# Patient Record
Sex: MALE | Race: WHITE
[De-identification: names, ages, dates, MRNs, and addresses within clinical notes are randomized per-mention and may not be internally consistent; named-entity substitution may affect disease eponyms.]

---

## 2018-07-07 ENCOUNTER — HOSPITAL ENCOUNTER (EMERGENCY)
Dept: HOSPITAL 97 - ER | Age: 53
Discharge: HOME | End: 2018-07-07
Payer: COMMERCIAL

## 2018-07-07 VITALS — OXYGEN SATURATION: 100 % | DIASTOLIC BLOOD PRESSURE: 89 MMHG | SYSTOLIC BLOOD PRESSURE: 155 MMHG

## 2018-07-07 VITALS — TEMPERATURE: 98.2 F

## 2018-07-07 DIAGNOSIS — J44.9: ICD-10-CM
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
## 2018-07-07 DIAGNOSIS — F17.210: ICD-10-CM

## 2018-07-07 DIAGNOSIS — S62.630A: Primary | ICD-10-CM

## 2018-07-07 DIAGNOSIS — Z88.6: ICD-10-CM
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
## 2018-07-07 PROCEDURE — 0JQJ0ZZ REPAIR RIGHT HAND SUBCUTANEOUS TISSUE AND FASCIA, OPEN APPROACH: ICD-10-PCS

## 2018-07-07 PROCEDURE — 99283 EMERGENCY DEPT VISIT LOW MDM: CPT

## 2018-07-07 NOTE — EDPHYS
Physician Documentation                                                                           

 [facility]                                                                

Name: Jose D Demarco                                                                             

Age: 52 yrs                                                                                       

Sex: Male                                                                                         

: 1965                                                                                   

MRN: [id number]                                                                                   

Arrival Date: 2018                                                                          

Time: 10:22                                                                                       

Account#: [id number]                                                                            

Bed 15                                                                                            

Private MD: Bryan Murdock T                                                                        

ED Physician Olaf Lara                                                                       

HPI:                                                                                              

                                                                                             

10:36 This 52 yrs old  Male presents to ER via Ambulatory with complaints of Finger  cp  

      laceration.                                                                                 

10:36 The patient or guardian reports injury, a laceration, irregular, dirty. The complaints  cp  

      affect the right index finger. Context: resulted from finger being caught in chain          

      link. Onset: The symptoms/episode began/occurred just prior to arrival.                     

10:36 Associated signs and symptoms: Pertinent negatives: cyanosis distally, numbness         cp  

      distally.                                                                                   

                                                                                                  

Historical:                                                                                       

- Allergies:                                                                                      

10:34 Aspirin;                                                                                hj  

- Home Meds:                                                                                      

10:34 Albuterol Inhl [Active]; duloxetine Oral [Active]; Metformin Oral [Active]; naproxen    hj  

      Oral [Active]; montelukast Oral [Active];                                                   

10:34 levothyroxine 75 mcg tab 1 tab once daily [Active];                                     hj  

- PMHx:                                                                                           

10:34 Anemia; Asthma; Bronchitis; COPD;                                                       hj  

- PSHx:                                                                                           

10:34 Appendectomy;                                                                           hj  

                                                                                                  

- Immunization history:: Adult Immunizations up to date.                                          

- Social history:: Smoking status: Patient uses tobacco products, smokes one pack                 

  cigarettes per day.                                                                             

- Ebola Screening: : Patient negative for fever greater than or equal to 101.5 degrees            

  Fahrenheit, and additional compatible Ebola Virus Disease symptoms Patient denies               

  exposure to infectious person Patient denies travel to an Ebola-affected area in the            

  21 days before illness onset No symptoms or risks identified at this time.                      

                                                                                                  

                                                                                                  

ROS:                                                                                              

10:38 Eyes: Negative for injury, pain, redness, and discharge.                                cp  

10:38 Constitutional: Negative for body aches, chills, fever, poor PO intake.                     

10:38 ENT: Negative for drainage from ear(s), ear pain, sore throat, difficulty swallowing,       

      difficulty handling secretions.                                                             

10:38 Cardiovascular: Negative for chest pain.                                                    

10:38 Respiratory: Negative for cough, shortness of breath, wheezing.                             

10:38 Abdomen/GI: Negative for abdominal pain, vomiting, diarrhea, constipation.                  

10:38 MS/extremity: Positive for injury or acute deformity, laceration, pain, of the right        

      index finger.                                                                               

10:38 All other systems are negative.                                                             

                                                                                                  

Exam:                                                                                             

10:45 Constitutional: The patient appears in no acute distress, alert, awake, non-toxic, well cp  

      developed, well nourished.                                                                  

10:45 Head/Face:  Normocephalic, atraumatic.                                                  cp  

10:45 Eyes: Periorbital structures: appear normal, Conjunctiva: normal, no exudate, no            

      injection, Lids and lashes: appear normal, bilaterally.                                     

10:45 ENT: External ear(s): are unremarkable, Nose: is normal, Mouth: is normal, Posterior        

      pharynx: is normal, airway is patent.                                                       

10:45 Chest/axilla: Inspection: normal.                                                           

10:45 Cardiovascular: Rate: tachycardic.                                                          

10:45 Respiratory: the patient does not display signs of respiratory distress,  Respirations:     

      normal, no use of accessory muscles, no retractions, no splinting, no tachypnea.            

10:45 Abdomen/GI: Exam negative for discomfort, distension, guarding, Inspection: abdomen         

      appears normal.                                                                             

10:45 Musculoskeletal/extremity: ROM: full active range of motion, in the right index finger,     

      Perfusion: the extremity is normally perfused throughout, Tendon exam: specific tendon      

      testing normal through active and passive range of motion                                   

10:45 Skin: injury, laceration(s), of the lateral aspect distal phalanx right index finger,       

      that can be described as linear, with mild bleeding.                                        

10:45 Neuro: Sensation: 2 point discrimination is normal.                                         

                                                                                                  

Vital Signs:                                                                                      

10:36  / 87; Pulse 105; Resp 18; Temp 98.2(O); Pulse Ox 97% on R/A; Weight 117.93 kg;   hj  

      Height 5 ft. 11 in. (180.34 cm); Pain 10/10;                                                

12:38  / 89; Pulse 92; Resp 18; Pulse Ox 100% on R/A;                                   hj  

10:36 Body Mass Index 36.26 (117.93 kg, 180.34 cm)                                            hj  

                                                                                                  

Laceration:                                                                                       

12:15 Wound Repair of 2.5cm ( 1.0in ) subcutaneous laceration to lateral aspect distal        cp  

      phalanx right index finger. Linear shaped.. Distal neuro/vascular/tendon intact.            

      Anesthesia: Digital block administered with 5 mls of Lido/Marcaine. Wound prep:             

      Moderate cleansing by nurse, Wound irrigation by nurse. Skin closed with 4 5-0 Prolene      

      using simple sutures and sterile technique. Dressed with Bacitracin, tube gauze.            

      Patient tolerated well.                                                                     

                                                                                                  

MDM:                                                                                              

10:25 Patient medically screened.                                                             cp  

10:45 Differential diagnosis: dislocation, open fracture, closed fracture, nail injury.       cp  

12:19 Data reviewed: vital signs, nurses notes, radiologic studies, plain films.              cp  

12:19 Test interpretation: by ED physician or midlevel provider: plain radiologic studies.    cp  

      Counseling: I had a detailed discussion with the patient and/or guardian regarding: the     

      historical points, exam findings, and any diagnostic results supporting the                 

      discharge/admit diagnosis, radiology results, the need for outpatient follow up, a          

      family practitioner, to return to the emergency department if symptoms worsen or            

      persist or if there are any questions or concerns that arise at home. Response to           

      treatment: the patient's symptoms have markedly improved after treatment, and as a          

      result, I will discharge patient.                                                           

                                                                                                  

                                                                                             

10:32 Order name: XRAY Hand RIGHT 3 View; Complete Time: 11:26                                cp  

                                                                                             

10:32 Order name: Dressing - Wound; Complete Time: 10:53                                      cp  

                                                                                             

10:32 Order name: Gloves, Sterile; Complete Time: 10:53                                       cp  

                                                                                             

10:32 Order name: Setup Suture Tray; Complete Time: 10:53                                     cp  

                                                                                             

10:32 Order name: Wound Care: please clean and irrigate wound; Complete Time: 11:01           cp  

                                                                                                  

Administered Medications:                                                                         

10:46 Drug: Ibuprofen 800 mg Route: PO;                                                         

11:02 Follow up: Response: No adverse reaction                                                hj  

11:31 Drug: Lidocaine (1 %) 5 ml Volume: 5 ml; Route: Infiltration;                           hj  

11:31 Drug: Marcaine (0.5 %) 5 ml Volume: 10 ml; Route: Infiltration;                           

                                                                                                  

                                                                                                  

Disposition:                                                                                      

18 12:20 Discharged to Home. Impression: Laceration without foreign body of right index     

  finger without damage to nail, Avulsion Fracture Distal Phalanx Right Index                     

  Finger.                                                                                         

- Condition is Stable.                                                                            

- Discharge Instructions: Laceration Care, Adult.                                                 

- Prescriptions for Keflex 500 mg Oral Capsule - take 1 capsule by ORAL route every 6             

  hours for 10 days; 40 capsule. Tramadol 50 mg Oral Tablet - take 1 tablet by ORAL               

  route every 8 hours as needed; 12 tablet.                                                       

- Medication Reconciliation Form, Thank You Letter, Antibiotic Education, Prescription            

  Opioid Use form.                                                                                

- Follow up: Guzman Zaragoza MD; When: 5 - 6 days; Reason: Recheck today's complaints.           

- Problem is new.                                                                                 

- Symptoms have improved.                                                                         

                                                                                                  

                                                                                                  

                                                                                                  

Addendum:                                                                                         

07/15/2018                                                                                        

     20:40 Co-signature as Attending Physician, Olaf Lara MD I agree with the assessment and   k
dr

           plan of care.                                                                          

                                                                                                  

Signatures:                                                                                       

Dispatcher [employer]                           EDMS                                                 

Olaf Lara MD MD   [facility]                                                  

Dane Painter RN                      RN   hj                                                   

Yunier Powell PA                         PA   cp                                                   

                                                                                                  

Corrections: (The following items were deleted from the chart)                                    

                                                                                             

12:23 12:20 2018 12:20 Discharged to Home. Impression: Laceration without foreign body  cp  

      of right index finger without damage to nail. Condition is Stable. Forms are Medication     

      Reconciliation Form, Thank You Letter, Antibiotic Education, Prescription Opioid Use.       

      Follow up: Guzman Zaragoza; When: 5 - 6 days; Reason: Recheck today's complaints.            

      Problem is new. Symptoms have improved. cp                                                  

12:38 12:23 2018 12:20 Discharged to Home. Impression: Laceration without foreign body  hj  

      of right index finger without damage to nail; Avulsion Fracture Distal Phalanx Right        

      Index Finger. Condition is Stable. Discharge Instructions: Laceration Care, Adult.          

      Prescriptions for Keflex 500 mg Oral Capsule - take 1 capsule by ORAL route every 6         

      hours for 10 days; 40 capsule, Tramadol 50 mg Oral Tablet - take 1 tablet by ORAL route     

      every 8 hours as needed; 12 tablet. and Forms are Medication Reconciliation Form, Thank     

      You Letter, Antibiotic Education, Prescription Opioid Use. Follow up: Guzman Zaragoza;       

      When: 5 - 6 days; Reason: Recheck today's complaints. Problem is new. Symptoms have         

      improved. cp                                                                                

                                                                                                  

**************************************************************************************************

## 2018-07-07 NOTE — RAD REPORT
EXAM DESCRIPTION:  RAD - Hand Right 3 View - 7/7/2018 10:46 am

 

CLINICAL HISTORY:  Right hand pain status post injury

 

FINDINGS:  Bony density lies along the dorsal aspect of the second DIP joint. Most likely this repres
ents an ossicle. An avulsion fracture is doubtful but should be correlated clinically.

 

Swelling involves the soft tissues of the second distal phalanx.

 

No dislocation is noted.

## 2018-07-07 NOTE — ER
Nurse's Notes                                                                                     

 [facility]                                                                

Name: Jose D Demarco                                                                             

Age: 52 yrs                                                                                       

Sex: Male                                                                                         

: 1965                                                                                   

MRN: [id number]                                                                                   

Arrival Date: 2018                                                                          

Time: 10:22                                                                                       

Account#: [id number]                                                                            

Bed 15                                                                                            

Private MD: Bryan Murdock T                                                                        

Diagnosis: Laceration without foreign body of right index finger without damage to nail;Avulsion  

  Fracture Distal Phalanx Right Index Finger                                                      

                                                                                                  

Presentation:                                                                                     

                                                                                             

10:29 Presenting complaint: Patient states: at work today around 1030 am when my R index      hj  

      finger was caught on the sprocket on the gate, got laceration; reports throbbing pain;      

      denies tingling;. Transition of care: patient was not received from another setting of      

      care. Onset of symptoms was 2018. Risk Assessment: Do you want to hurt             

      yourself or someone else? Patient reports no desire to harm self or others. Initial         

      Sepsis Screen: Does the patient meet any 2 criteria? No. Patient's initial sepsis           

      screen is negative. Does the patient have a suspected source of infection? No.              

      Patient's initial sepsis screen is negative. Care prior to arrival: None.                   

10:29 Method Of Arrival: Ambulatory                                                             

10:29 Acuity: GEORGETTE 4                                                                           hj  

                                                                                                  

Triage Assessment:                                                                                

10:35 General: Appears in no apparent distress. uncomfortable, Behavior is calm, cooperative, hj  

      appropriate for age. Pain: Complains of pain in dorsal aspect of distal phalanx of          

      right index finger and right index fingernail.                                              

                                                                                                  

Historical:                                                                                       

- Allergies:                                                                                      

10:34 Aspirin;                                                                                hj  

- Home Meds:                                                                                      

10:34 Albuterol Inhl [Active]; duloxetine Oral [Active]; Metformin Oral [Active]; naproxen    hj  

      Oral [Active]; montelukast Oral [Active];                                                   

10:34 levothyroxine 75 mcg tab 1 tab once daily [Active];                                     hj  

- PMHx:                                                                                           

10:34 Anemia; Asthma; Bronchitis; COPD;                                                       hj  

- PSHx:                                                                                           

10:34 Appendectomy;                                                                           hj  

                                                                                                  

- Immunization history:: Adult Immunizations up to date.                                          

- Social history:: Smoking status: Patient uses tobacco products, smokes one pack                 

  cigarettes per day.                                                                             

- Ebola Screening: : Patient negative for fever greater than or equal to 101.5 degrees            

  Fahrenheit, and additional compatible Ebola Virus Disease symptoms Patient denies               

  exposure to infectious person Patient denies travel to an Ebola-affected area in the            

  21 days before illness onset No symptoms or risks identified at this time.                      

                                                                                                  

                                                                                                  

Screening:                                                                                        

10:35 Abuse screen: Denies threats or abuse. Denies injuries from another. Nutritional        hj  

      screening: No deficits noted. Tuberculosis screening: No symptoms or risk factors           

      identified. Fall Risk None identified.                                                      

                                                                                                  

Vital Signs:                                                                                      

10:36  / 87; Pulse 105; Resp 18; Temp 98.2(O); Pulse Ox 97% on R/A; Weight 117.93 kg;   hj  

      Height 5 ft. 11 in. (180.34 cm); Pain 10/10;                                                

12:38  / 89; Pulse 92; Resp 18; Pulse Ox 100% on R/A;                                   hj  

10:36 Body Mass Index 36.26 (117.93 kg, 180.34 cm)                                            hj  

                                                                                                  

ED Course:                                                                                        

10:22 Patient arrived in ED.                                                                  mr  

10:22 Bryan Murdock MD is Private Physician.                                                   mr  

10:24 Yunier Powell PA is PHCP.                                                                cp  

10:24 Olfa Lara MD is Attending Physician.                                              cp  

10:29 Dane Painter, RN is Primary Nurse.                                                    hj  

10:31 Triage completed.                                                                       hj  

10:35 Arm band placed on right wrist.                                                         hj  

10:35 Patient has correct armband on for positive identification. Bed in low position. Call   hj  

      light in reach. Side rails up X 1.                                                          

10:46 XRAY Hand RIGHT 3 View In Process Unspecified.                                          EDMS

12:19 Guzman Zaragoza MD is Referral Physician.                                              cp  

12:37 No provider procedures requiring assistance completed. Patient did not have IV access   hj  

      during this emergency room visit.                                                           

                                                                                                  

Administered Medications:                                                                         

10:46 Drug: Ibuprofen 800 mg Route: PO;                                                       hj  

11:02 Follow up: Response: No adverse reaction                                                hj  

11:31 Drug: Lidocaine (1 %) 5 ml Volume: 5 ml; Route: Infiltration;                           hj  

11:31 Drug: Marcaine (0.5 %) 5 ml Volume: 10 ml; Route: Infiltration;                         hj  

                                                                                                  

                                                                                                  

Outcome:                                                                                          

12:20 Discharge ordered by MD.                                                                cp  

12:37 Discharged to home ambulatory, with family.                                             hj  

12:37 Condition: stable                                                                           

12:37 Discharge instructions given to patient, family, Instructed on discharge instructions,      

      follow up and referral plans. medication usage, wound care, Demonstrated understanding      

      of instructions, follow-up care, medications, wound care, Prescriptions given X 2.          

12:38 Patient left the ED.                                                                    hj  

                                                                                                  

Signatures:                                                                                       

Dispatcher [employer]                           Candice Brown                                mr                                                   

Dane Painter, RN                      RN   Yunier Arthur PA                         PA   cp                                                   

                                                                                                  

**************************************************************************************************

## 2019-02-06 ENCOUNTER — HOSPITAL ENCOUNTER (EMERGENCY)
Dept: HOSPITAL 97 - ER | Age: 54
Discharge: HOME | End: 2019-02-06
Payer: COMMERCIAL

## 2019-02-06 DIAGNOSIS — I10: ICD-10-CM

## 2019-02-06 DIAGNOSIS — Z88.6: ICD-10-CM

## 2019-02-06 DIAGNOSIS — E11.9: ICD-10-CM

## 2019-02-06 DIAGNOSIS — R33.9: ICD-10-CM

## 2019-02-06 DIAGNOSIS — J44.9: ICD-10-CM

## 2019-02-06 DIAGNOSIS — N32.3: Primary | ICD-10-CM

## 2019-02-06 PROCEDURE — 76377 3D RENDER W/INTRP POSTPROCES: CPT

## 2019-02-06 PROCEDURE — 96372 THER/PROPH/DIAG INJ SC/IM: CPT

## 2019-02-06 PROCEDURE — 74176 CT ABD & PELVIS W/O CONTRAST: CPT

## 2019-02-06 PROCEDURE — 99285 EMERGENCY DEPT VISIT HI MDM: CPT

## 2019-02-06 PROCEDURE — 51702 INSERT TEMP BLADDER CATH: CPT

## 2019-02-06 NOTE — ER
Nurse's Notes                                                                                     

 [facility]                                                                

Name: Jose D Demarco                                                                             

Age: 53 yrs                                                                                       

Sex: Male                                                                                         

: 1965                                                                                   

MRN: [id number]                                                                                   

Arrival Date: 2019                                                                          

Time: 19:29                                                                                       

Account#: [id number]                                                                            

Bed 13                                                                                            

Private MD: Bryan Murdock T                                                                        

Diagnosis: Diverticulum of bladder;Low back pain;Retention of urine                               

                                                                                                  

Presentation:                                                                                     

                                                                                             

19:45 Presenting complaint: Patient states: Left lower back pain for 1 week that got worse    aj  

      last night. Reports similar incident in August. Transition of care: patient was not         

      received from another setting of care. Onset of symptoms was 2019. Risk         

      Assessment: Do you want to hurt yourself or someone else? Patient reports no desire to      

      harm self or others. Initial Sepsis Screen: Does the patient meet any 2 criteria? No.       

      Patient's initial sepsis screen is negative. Does the patient have a suspected source       

      of infection? No. Patient's initial sepsis screen is negative. Care prior to arrival:       

      None.                                                                                       

19:45 Method Of Arrival: Ambulatory                                                           aj  

19:45 Acuity: GEORGETTE 4                                                                           aj  

                                                                                                  

Triage Assessment:                                                                                

19:45 General: Appears in no apparent distress. uncomfortable, Behavior is calm, cooperative, aj  

      appropriate for age. Pain: Complains of pain in left low back, left lower back, left        

      gluteus chad and left gluteal fold. Neuro: Level of Consciousness is awake, alert,       

      obeys commands, Oriented to person, place, time, situation, Appropriate for age.            

      Respiratory: Airway is patent Respiratory effort is even, unlabored, Respiratory            

      pattern is regular, symmetrical. Derm: Skin is intact, is healthy with good turgor,         

      Skin is pink, warm \T\ dry. normal. Musculoskeletal: Circulation, motion, and sensation     

      intact. Range of motion: intact in all extremities.                                         

                                                                                                  

Historical:                                                                                       

- Allergies:                                                                                      

19:45 Aspirin;                                                                                aj  

- Home Meds:                                                                                      

19:45 Albuterol Inhl [Active]; duloxetine Oral [Active]; levothyroxine 75 mcg tab 1 tab once  aj  

      daily [Active]; Metformin Oral [Active]; Naproxen Oral [Active]; cansartan [Active];        

      tujeo [Active];                                                                             

- PMHx:                                                                                           

19:45 Asthma; Bronchitis; COPD; Diabetes - IDDM; Hypertension;                                aj  

- PSHx:                                                                                           

19:45 Appendectomy;                                                                           aj  

                                                                                                  

- Immunization history:: Adult Immunizations up to date.                                          

- Social history:: Smoking status: Patient uses tobacco products, smokes one pack                 

  cigarettes per day.                                                                             

- Ebola Screening: : Patient negative for fever greater than or equal to 101.5 degrees            

  Fahrenheit, and additional compatible Ebola Virus Disease symptoms Patient denies               

  exposure to infectious person Patient denies travel to an Ebola-affected area in the            

  21 days before illness onset No symptoms or risks identified at this time.                      

                                                                                                  

                                                                                                  

Screenin:49 Abuse screen: Denies threats or abuse. Denies injuries from another. Nutritional        rr5 

      screening: No deficits noted. Tuberculosis screening: No symptoms or risk factors           

      identified. Fall Risk None identified. Total Rubio Fall Scale indicates No Risk (0-24       

      pts).                                                                                       

                                                                                                  

Assessment:                                                                                       

20:25 General: Appears in no apparent distress. uncomfortable, Behavior is calm, cooperative, rr5 

      appropriate for age. Pain: Complains of pain in low back area, left low back and            

      buttocks Pain radiates to left leg Pain currently is 8 out of 10 on a pain scale.           

      Quality of pain is described as aching, Pain began gradually, Is intermittent,              

      Aggravated by weight bearing. Neuro: Level of Consciousness is awake, alert, obeys          

      commands, Oriented to person, place, time, situation, Appropriate for age.                  

      Cardiovascular: Capillary refill < 3 seconds Patient's skin is warm and dry.                

      Respiratory: Airway is patent Respiratory effort is even, unlabored, Respiratory            

      pattern is regular, symmetrical.                                                            

20:25 GI: No signs and/or symptoms were reported involving the gastrointestinal system. :   rr5 

      No signs and/or symptoms were reported regarding the genitourinary system. EENT: No         

      signs and/or symptoms were reported regarding the EENT system. Derm: Skin is intact,        

      Skin is pink, warm \T\ dry. Skin temperature is warm. Musculoskeletal: Capillary refill <   

      3 seconds, Range of motion: limited in left hip and back.                                   

21:30 Reassessment: Patient appears in no apparent distress at this time. No changes from     rr5 

      previously documented assessment. Patient is alert, oriented x 3, equal unlabored           

      respirations, skin warm/dry/pink. awaiting for CT scan result.                              

22:40 Reassessment: Patient appears in no apparent distress at this time. Patient is alert,   rr5 

      oriented x 3, equal unlabored respirations, skin warm/dry/pink. Calvillo catheter inserted     

      aseptically F16 connected to urine bag. positive returned of urine.                         

23:23 Reassessment: Patient appears in no apparent distress at this time. Patient is alert,   rr5 

      oriented x 3, equal unlabored respirations, skin warm/dry/pink. I feel better now.          

      discharge instruction given and explained with out complaints made. follow up care for      

      the Calvillo catheter taught. Patient denies pain at this time. Patient states feeling         

      better. Patient states symptoms have improved.                                              

                                                                                                  

Vital Signs:                                                                                      

19:45  / 92; Pulse 96; Resp 20; Temp 98.0; Pulse Ox 98% on R/A; Weight 96.62 kg; Height aj  

      5 ft. 11 in. (180.34 cm);                                                                   

21:00  / 70; Pulse 70; Resp 16; Pulse Ox 99% ;                                          rr5 

22:00  / 81; Pulse 90; Resp 17; Pulse Ox 100% ;                                         rr5 

23:00  / 76; Pulse 81; Resp 17; Pulse Ox 100% ;                                         rr5 

19:45 Body Mass Index 29.71 (96.62 kg, 180.34 cm)                                               

                                                                                                  

ED Course:                                                                                        

19:29 Patient arrived in ED.                                                                  am2 

19:29 Bryan Murdock MD is Private Physician.                                                   am2 

19:45 Arm band placed on right wrist. Patient placed in waiting room, Patient notified of       

      wait time.                                                                                  

19:46 Triage completed.                                                                       aj  

20:25 Elevated prefer to be on sitting position.                                              rr5 

20:25 Patient has correct armband on for positive identification. Bed in low position. Call   rr5 

      light in reach.                                                                             

20:40 David Rosenbaum RN is Primary Nurse.                                                    rr5 

20:42 [facility]                                                        snw 

20:42 Yunier Hercules MD is Attending Physician.                                             snw 

20:43 David Rosenbaum RN is Primary Nurse.                                                    rr5 

21:16 Initial Neb Treatment Given as ordered Patient was instructed and evaluated on          rr5 

      procedure Patient tolerated procedure well without adverse effect.                          

21:23 Radiology exam delayed due to patient receiving breathing treatment at this time.       vm2 

21:32 Patient moved to CT.                                                                    vm2 

21:35 CT completed. Patient tolerated procedure well. Patient moved back from CT.             vm2 

21:49 CT Stone Protocol In Process Unspecified.                                               EDMS

22:40 Calvillo cath inserted, using sterile technique, 16 Fr., by me, balloon inflated, to       rr5 

      gravity drainage, returned clear yellow urine. Patient tolerated well. sent home with       

      catheter.                                                                                   

23:14 No provider procedures requiring assistance completed. Patient did not have IV access   rr5 

      during this emergency room visit.                                                           

                                                                                                  

Administered Medications:                                                                         

21:16 Drug: fentaNYL (PF) 50 mcg Route: IM; Site: right gluteus;                              rr5 

23:13 Follow up: Response: No adverse reaction                                                rr5 

21:16 Drug: Valium 5 mg Route: PO;                                                            rr5 

23:12 Follow up: Response: No adverse reaction                                                rr5 

21:16 Drug: Albuterol 2.5 mg Route: Inhalation;                                               rr5 

21:45 Drug: Albuterol 2.5 mg Route: Inhalation;                                               rr5 

22:15 Drug: Albuterol 2.5 mg Route: Inhalation;                                               rr5 

23:12 Follow up: Response: No adverse reaction                                                rr5 

                                                                                                  

                                                                                                  

Output:                                                                                           

23:05 Urine: 1500ml (Calvillo); Total: 1500ml.                                                   rr5 

                                                                                                  

Outcome:                                                                                          

22:27 Discharge ordered by MD.                                                                [email or web address] 

23:27 Discharged to home ambulatory, with family.                                             rr5 

23:27 Condition: stable                                                                           

23:27 Discharge instructions given to patient, family, Instructed on discharge instructions,      

      follow up and referral plans. medication usage, Calvillo catheter care Demonstrated            

      understanding of instructions, follow-up care, medications, calvillo catheter care             

      Prescriptions given X 3.                                                                    

23:28 Patient left the ED.                                                                    rr5 

                                                                                                  

Signatures:                                                                                       

Dispatcher [employer]                           Heaven Dow, RN                       Carin Birch, MESSIC                 FNP-[facility] vm2 Roque, Raymond, RN RN   rr5                                                  

                                                                                                  

**************************************************************************************************

## 2019-02-06 NOTE — EDPHYS
Physician Documentation                                                                           

 [facility]                                                                

Name: Jose D Demarco                                                                             

Age: 53 yrs                                                                                       

Sex: Male                                                                                         

: 1965                                                                                   

MRN: [id number]                                                                                   

Arrival Date: 2019                                                                          

Time: 19:29                                                                                       

Account#: [id number]                                                                            

Bed 13                                                                                            

Private MD: Bryan Murdock T ED Physician Yunier Hercules                                                                      

HPI:                                                                                              

                                                                                             

22:12 This 53 yrs old  Male presents to ER via Ambulatory with complaints of Back    snw 

      Pain.                                                                                       

22:12 The patient presents with pain that is acute, with no known mechanism of injury, and    snw 

      decreased range of motion. The symptoms are located in the low back. Onset: The             

      symptoms/episode began/occurred suddenly, 1 week(s) ago, and became persistent. The         

      pain radiates to the left hip and left upper thigh. Associated signs and symptoms: The      

      patient has no apparent associated signs or symptoms. The problem was sustained from        

      unknown cause. Severity of symptoms: At their worst the symptoms were moderate, severe.     

      The patient has experienced a previous episode, approximately 8 months ago. It is           

      unknown whether or not the patient has recently seen a physician.                           

                                                                                                  

Historical:                                                                                       

- Allergies:                                                                                      

19:45 Aspirin;                                                                                aj  

- Home Meds:                                                                                      

19:45 Albuterol Inhl [Active]; duloxetine Oral [Active]; levothyroxine 75 mcg tab 1 tab once  aj  

      daily [Active]; Metformin Oral [Active]; Naproxen Oral [Active]; cansartan [Active];        

      tujeo [Active];                                                                             

- PMHx:                                                                                           

19:45 Asthma; Bronchitis; COPD; Diabetes - IDDM; Hypertension;                                aj  

- PSHx:                                                                                           

19:45 Appendectomy;                                                                           aj  

                                                                                                  

- Immunization history:: Adult Immunizations up to date.                                          

- Social history:: Smoking status: Patient uses tobacco products, smokes one pack                 

  cigarettes per day.                                                                             

- Ebola Screening: : Patient negative for fever greater than or equal to 101.5 degrees            

  Fahrenheit, and additional compatible Ebola Virus Disease symptoms Patient denies               

  exposure to infectious person Patient denies travel to an Ebola-affected area in the            

  21 days before illness onset No symptoms or risks identified at this time.                      

                                                                                                  

                                                                                                  

ROS:                                                                                              

22:11 Constitutional: Negative for fever, chills, and weight loss, Eyes: Negative for injury, snw 

      pain, redness, and discharge, ENT: Negative for injury, pain, and discharge, Neck:          

      Negative for injury, pain, and swelling, Cardiovascular: Negative for chest pain,           

      palpitations, and edema, Respiratory: Negative for shortness of breath, cough,              

      wheezing, and pleuritic chest pain, Abdomen/GI: Negative for abdominal pain, nausea,        

      vomiting, diarrhea, and constipation, : Negative for injury, bleeding, discharge, and     

      swelling, MS/Extremity: Negative for injury and deformity, Skin: Negative for injury,       

      rash, and discoloration, Neuro: Negative for headache, weakness, numbness, tingling,        

      and seizure.                                                                                

22:11 Back: Positive for decreased range of motion, pain at rest, pain with movement,             

      radiated pain, of the low back area.                                                        

                                                                                                  

Exam:                                                                                             

22:10 Constitutional:  This is a well developed, well nourished patient who is awake, alert,  snw 

      and in no acute distress. Head/Face:  Normocephalic, atraumatic. Eyes:  Pupils equal        

      round and reactive to light, extra-ocular motions intact.  Lids and lashes normal.          

      Conjunctiva and sclera are non-icteric and not injected.  Cornea within normal limits.      

      Periorbital areas with no swelling, redness, or edema. ENT:  Nares patent. No nasal         

      discharge, no septal abnormalities noted.  Tympanic membranes are normal and external       

      auditory canals are clear.  Oropharynx with no redness, swelling, or masses, exudates,      

      or evidence of obstruction, uvula midline.  Mucous membranes moist. Neck:  Trachea          

      midline, no thyromegaly or masses palpated, and no cervical lymphadenopathy.  Supple,       

      full range of motion without nuchal rigidity, or vertebral point tenderness.  No            

      Meningismus. Chest/axilla:  Normal chest wall appearance and motion.  Nontender with no     

      deformity.  No lesions are appreciated. Cardiovascular:  Regular rate and rhythm with a     

      normal S1 and S2.  No gallops, murmurs, or rubs.  Normal PMI, no JVD.  No pulse             

      deficits. Respiratory:  Lungs have equal breath sounds bilaterally, clear to                

      auscultation and percussion.  No rales, rhonchi or wheezes noted.  No increased work of     

      breathing, no retractions or nasal flaring. Abdomen/GI:  Soft, non-tender, with normal      

      bowel sounds.  No distension or tympany.  No guarding or rebound.  No evidence of           

      tenderness throughout. Skin:  Warm, dry with normal turgor.  Normal color with no           

      rashes, no lesions, and no evidence of cellulitis. MS/ Extremity:  Pulses equal, no         

      cyanosis.  Neurovascular intact.  Full, normal range of motion. Neuro:  Awake and           

      alert, GCS 15, oriented to person, place, time, and situation.  Cranial nerves II-XII       

      grossly intact.  Motor strength 5/5 in all extremities.  Sensory grossly intact.            

      Cerebellar exam normal.  Normal gait.                                                       

22:10 Back: pain, that is moderate, that is severe, of the  low back area and left low back,      

      with radiation to lateral left thigh and around to left anterior thigh, ROM is normal,      

      painful, normal spinal alignment noted, CVA tenderness, is absent, vertebral                

      tenderness, is not appreciated, muscle spasm, is appreciated in the low back area.          

                                                                                                  

Vital Signs:                                                                                      

19:45  / 92; Pulse 96; Resp 20; Temp 98.0; Pulse Ox 98% on R/A; Weight 96.62 kg; Height aj  

      5 ft. 11 in. (180.34 cm);                                                                   

21:00  / 70; Pulse 70; Resp 16; Pulse Ox 99% ;                                          rr5 

22:00  / 81; Pulse 90; Resp 17; Pulse Ox 100% ;                                         rr5 

23:00  / 76; Pulse 81; Resp 17; Pulse Ox 100% ;                                         rr5 

19:45 Body Mass Index 29.71 (96.62 kg, 180.34 cm)                                             aj  

                                                                                                  

MDM:                                                                                              

21:01 Patient medically screened.                                                             snw 

22:46 Data reviewed: vital signs, nurses notes. Data interpreted: Pulse oximetry: on room air snw 

      is 98 %. Interpretation: normal. Counseling: I had a detailed discussion with the           

      patient and/or guardian regarding: the historical points, exam findings, and any            

      diagnostic results supporting the discharge/admit diagnosis, the presence of at least       

      one elevated blood pressure reading (>120/80) during this emergency department visit,       

      radiology results, the need for outpatient follow up, to return to the emergency            

      department if symptoms worsen or persist or if there are any questions or concerns that     

      arise at home. Special discussion: Based on the patient's Hx, exam, and Dx evaluation,      

      there is no indication for emergent surgery or inpatient Tx. It is understood by the        

      patient/guardian that if the Sx's persist or worsen they need to return immediately for     

      re-evaluation. I have referred the patient to see his PCP for further evaluation of         

      high blood pressure. Based on the history and exam findings, there is no indication for     

      further emergent testing or inpatient evaluation. I discussed with the patient/guardian     

      the need to see the primary care provider for further evaluation of the symptoms. I         

      discussed with the patient/guardian the need to see the urologist for further               

      evaluation of the symptoms.                                                                 

                                                                                                  

                                                                                             

21:01 Order name: CT Stone Protocol                                                           snw 

                                                                                             

22:21 Order name: Sergio; Complete Time: 23:12                                                 snw 

                                                                                                  

Administered Medications:                                                                         

21:16 Drug: fentaNYL (PF) 50 mcg Route: IM; Site: right gluteus;                              rr5 

23:13 Follow up: Response: No adverse reaction                                                rr5 

21:16 Drug: Valium 5 mg Route: PO;                                                            rr5 

23:12 Follow up: Response: No adverse reaction                                                rr5 

21:16 Drug: Albuterol 2.5 mg Route: Inhalation;                                               rr5 

21:45 Drug: Albuterol 2.5 mg Route: Inhalation;                                               rr5 

22:15 Drug: Albuterol 2.5 mg Route: Inhalation;                                               rr5 

23:12 Follow up: Response: No adverse reaction                                                rr5 

                                                                                                  

                                                                                                  

Disposition:                                                                                      

                                                                                             

07:58 Co-signature as Attending Physician, Yunier Hercules MD I agree with the assessment and  yola 

      plan of care.                                                                               

                                                                                                  

Disposition:                                                                                      

19 22:27 Discharged to Home. Impression: Diverticulum of bladder, Low back pain,            

  Retention of urine.                                                                             

- Condition is Stable.                                                                            

- Discharge Instructions: Back Pain, Adult, Hill Catheter Care, Adult, Hypertension,             

  Musculoskeletal Pain, Acute Urinary Retention, Male, Cryotherapy, Rehydration, Adult,           

  Heat Therapy.                                                                                   

- Prescriptions for orphenadrine citrate 100 mg Oral Tablet Sustained Release - take 1            

  tablet by ORAL route 2 times per day As needed; 20 tablet. Albuterol Sulfate 2.5 mg             

  /3 mL (0.083 %) Inhalation Solution for Nebulization - inhale 1 unit by NEBULIZATION            

  route every 8 hours As needed; 1 box. Albuterol Sulfate 90 mcg/actuation - inhale 1-2           

  puff by INHALATION route every 4-6 hours; 1 Inhaler.                                            

- Medication Reconciliation Form, Thank You Letter, Antibiotic Education, Prescription            

  Opioid Use form.                                                                                

- Follow up: Private Physician; When: Tomorrow; Reason: Recheck today's complaints,               

  Continuance of care, Re-evaluation by your physician. Follow up: Emergency                      

  Department; When: As needed; Reason: Worsening of condition.                                    

                                                                                                  

                                                                                                  

                                                                                                  

Signatures:                                                                                       

Dispatcher [employer]                           Heaven Dow, RN                       Yunier Aponte MD MD cha Therrien, Shelly, FNP-C                 FNP-Csnw                                                  

David Rosenbaum, RN                      RN   rr5                                                  

                                                                                                  

Corrections: (The following items were deleted from the chart)                                    

                                                                                             

23:28 22:27 2019 22:27 Discharged to Home. Impression: Diverticulum of bladder; Low     rr5 

      back pain; Retention of urine. Condition is Stable. Forms are Medication Reconciliation     

      Form, Thank You Letter, Antibiotic Education, Prescription Opioid Use. Follow up:           

      Private Physician; When: Tomorrow; Reason: Recheck today's complaints, Continuance of       

      care, Re-evaluation by your physician. Follow up: Emergency Department; When: As            

      needed; Reason: Worsening of condition. snw                                                 

                                                                                                  

**************************************************************************************************

## 2019-02-07 VITALS — TEMPERATURE: 98 F

## 2019-02-07 VITALS — DIASTOLIC BLOOD PRESSURE: 76 MMHG | SYSTOLIC BLOOD PRESSURE: 137 MMHG

## 2019-02-07 VITALS — OXYGEN SATURATION: 100 %

## 2019-02-07 NOTE — RAD REPORT
EXAM DESCRIPTION:  CT - Stone Protocol - 2/6/2019 10:16 pm

 

CLINICAL HISTORY:  CT Abdomen and Pelvis Without Intravenous Contrast

 

CLINICAL HISTORY:  The patient is 53 years old and is Male; FLANK PAIN.

 

COMPARISON:  None.

 

TECHNIQUE:

Axial computed tomography images of the abdomen and pelvis without intravenous contrast. Sagittal and
 coronal reformatted images were created and reviewed. This CT exam was performed using one or more o
f the following dose reduction techniques: Automated exposure control, adjustment of the mA and/or kV
 according to patient size, and/or use of iterative reconstruction technique.

 

FINDINGS:

LUNG BASES: Unremarkable. No mass. No consolidation.

 

ABDOMEN:

LIVER: Unremarkable.

GALLBLADDER AND BILE DUCTS: Unremarkable. No calcified stones. No ductal dilation.

PANCREAS: Unremarkable. No ductal dilation.

SPLEEN: Unremarkable. No stones.

ADRENALS: Unremarkable. No mass.

KIDNEYS AND URETERS: Right renal parenchymal calcification with focal irregular contour, likely scarr
ing.

No hydronephrosis.

STOMACH AND BOWEL: Unremarkable. No obstruction. No mucosal thickening.

 

PELVIS:

APPENDIX: No findings to suggest acute appendicitis.

BLADDER: Marked fluid distention of the urinary bladder measuring up to 19.3 cm in cranial caudal dim
ension. Additionally, there is a posteriorly oriented 3.8 x 3.4 cm urinary bladder diverticulum on th
e left.

REPRODUCTIVE: There is mild heterogenous enlargement of the prostate with central calcification.

 

ABDOMEN and PELVIS:

INTRAPERITONEAL SPACE: Unremarkable. No free air. No significant fluid collection.

BONE/JOINTS: L3-L4 and L4-L5 degenerative changes with multiple deformities and reactive endplate yola
nges. Facet arthropathy throughout the lumbar spine. Mild scoliosis of the lumbar spine.

SOFT TISSUES: Unremarkable.

VASCULATURE: Unremarkable. No abdominal aortic aneurysm.

LYMPH NODES: Unremarkable. No enlarged lymph nodes.

 

IMPRESSION:  1. No obstructive uropathy.

 

2. Marked fluid distention of the urinary bladder concerning for bladder outlet obstruction.

 

3. Focal area of right renal scarring. No hydronephrosis.

 

4. Mild heterogenous enlargement of the prostate.

 

5. Multilevel multifactorial degenerative changes.

 

 

Electronically signed by: Jaun Flores DO 02/06/2019 9:59 PM CST Workstation: [id number]

 

 

 

Due to temporary technical issues with the PACS/Fluency reporting system, reports are being signed by
 the in house radiologist as a courtesy to ensure prompt reporting. The interpreting radiologist is f
ully responsible for the content of the report.

## 2020-09-06 ENCOUNTER — HOSPITAL ENCOUNTER (OUTPATIENT)
Dept: HOSPITAL 97 - ER | Age: 55
Setting detail: OBSERVATION
Discharge: LEFT BEFORE DISCHARGE | End: 2020-09-06
Payer: COMMERCIAL

## 2020-09-06 DIAGNOSIS — R44.1: ICD-10-CM

## 2020-09-06 DIAGNOSIS — Z79.1: ICD-10-CM

## 2020-09-06 DIAGNOSIS — Z79.84: ICD-10-CM

## 2020-09-06 DIAGNOSIS — E03.9: ICD-10-CM

## 2020-09-06 DIAGNOSIS — Z87.891: ICD-10-CM

## 2020-09-06 DIAGNOSIS — J44.9: ICD-10-CM

## 2020-09-06 DIAGNOSIS — F41.9: ICD-10-CM

## 2020-09-06 DIAGNOSIS — Z79.899: ICD-10-CM

## 2020-09-06 DIAGNOSIS — R41.82: Primary | ICD-10-CM

## 2020-09-06 DIAGNOSIS — G25.81: ICD-10-CM

## 2020-09-06 DIAGNOSIS — I10: ICD-10-CM

## 2020-09-06 DIAGNOSIS — F32.9: ICD-10-CM

## 2020-09-06 DIAGNOSIS — E11.65: ICD-10-CM

## 2020-09-06 LAB
ALBUMIN SERPL BCP-MCNC: 3.4 G/DL (ref 3.4–5)
ALP SERPL-CCNC: 127 U/L (ref 45–117)
ALT SERPL W P-5'-P-CCNC: 27 U/L (ref 12–78)
AST SERPL W P-5'-P-CCNC: 11 U/L (ref 15–37)
BUN BLD-MCNC: 14 MG/DL (ref 7–18)
GLUCOSE SERPLBLD-MCNC: 332 MG/DL (ref 74–106)
HCT VFR BLD CALC: 40 % (ref 39.6–49)
INR BLD: 1.03
LYMPHOCYTES # SPEC AUTO: 2.3 K/UL (ref 0.7–4.9)
METHAMPHET UR QL SCN: NEGATIVE
MORPHOLOGY BLD-IMP: (no result)
PMV BLD: 9 FL (ref 7.6–11.3)
POTASSIUM SERPL-SCNC: 4 MMOL/L (ref 3.5–5.1)
RBC # BLD: 4.71 M/UL (ref 4.33–5.43)
THC SERPL-MCNC: NEGATIVE NG/ML

## 2020-09-06 PROCEDURE — 85730 THROMBOPLASTIN TIME PARTIAL: CPT

## 2020-09-06 PROCEDURE — 82947 ASSAY GLUCOSE BLOOD QUANT: CPT

## 2020-09-06 PROCEDURE — 80320 DRUG SCREEN QUANTALCOHOLS: CPT

## 2020-09-06 PROCEDURE — 80329 ANALGESICS NON-OPIOID 1 OR 2: CPT

## 2020-09-06 PROCEDURE — 85610 PROTHROMBIN TIME: CPT

## 2020-09-06 PROCEDURE — 80307 DRUG TEST PRSMV CHEM ANLYZR: CPT

## 2020-09-06 PROCEDURE — 36415 COLL VENOUS BLD VENIPUNCTURE: CPT

## 2020-09-06 PROCEDURE — 93005 ELECTROCARDIOGRAM TRACING: CPT

## 2020-09-06 PROCEDURE — 85025 COMPLETE CBC W/AUTO DIFF WBC: CPT

## 2020-09-06 PROCEDURE — 83655 ASSAY OF LEAD: CPT

## 2020-09-06 PROCEDURE — 70450 CT HEAD/BRAIN W/O DYE: CPT

## 2020-09-06 PROCEDURE — 96374 THER/PROPH/DIAG INJ IV PUSH: CPT

## 2020-09-06 PROCEDURE — 99285 EMERGENCY DEPT VISIT HI MDM: CPT

## 2020-09-06 PROCEDURE — 80076 HEPATIC FUNCTION PANEL: CPT

## 2020-09-06 PROCEDURE — 81003 URINALYSIS AUTO W/O SCOPE: CPT

## 2020-09-06 PROCEDURE — 80048 BASIC METABOLIC PNL TOTAL CA: CPT

## 2020-09-06 NOTE — P.HP
Certification for Inpatient


Patient admitted to: Observation


With expected LOS: <2 Midnights


Practitioner: I am a practitioner with admitting privileges, knowledge of 

patient current condition, hospital course, and medical plan of care.


Services: Services provided to patient in accordance with Admission requirements

found in Title 42 Section 412.3 of the Code of Federal Regulations





Patient History


Date of Service: 09/06/20


Reason for admission: Altered mental status, hallucinations


History of Present Illness: 





55-year-old male, PMH:  HTN, DM2, COPD, hypothyroidism, anxiety who presents to 

the ED due to altered mental status (confusion and decreased concentration) and 

visual hallucinations that began last night.  Patient reports last night he 

began were seen hallucinations, such as seen a rope that is not fair, seen other

items that were not there when he tried to reach for, a 1 point he was walking 

in his living room and ran into his couch thinking he was walking across the 

parking lot.  He reports a long history of restless leg syndrome, and states 

that his restless leg has been worse today, involving more of his body/upper 

arms.  He denies auditory hallucinations.  He was slurring for me, when I asked 

him if this was new, he states it is in that his daughter mentioned that earlier

today as well.  He is otherwise without complaints, denies fever/chills, vision 

changes, hearing changes, difficulty swallowing, chest pain, palpitations, 

shortness of breath, abdominal pain, change in bowel or bladder function, 

numbness/tingling, weakness, rash/lesions.  He denies any history of 

hallucinations.


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  He says for the past month he 

has been working on several boats, repeating teen, using eproxy and 

acetone/paint thinner.  He has been doing this outside and not wearing a 

mask/respirator.


In the ED CBC, coags, CMP, UA, UDS were all rather unremarkable with exception 

of hyperglycemia (332), and a mildly elevated alkaline phosphatase of 127.


CT brain:  No acute intracranial abnormalities seen. 


Allergies





aspirin Allergy (Severe, Verified 08/10/17 17:04)


   Anaphylaxis





Home Medications: 








Duloxetine HCl [Cymbalta] 60 mg PO DAILY 08/10/17 


Levothyroxine Sodium 75 mcg PO [id number] 08/10/17 


Metformin ER [Glucophage ER*] 500 mg PO BID 08/10/17 


Albuterol Sulfate [Albuterol Sulfate 0.083% Neb Soln] 2.5 mg IH Q6HR #60 

vial.neb 08/11/17 


Arformoterol Tartrate [Brovana] 15 mcg NEB BIDRESP #60 vial.neb 08/11/17 


Guaifen W/Codeine Syrup [ROBITUSSIN A-C Syrup] 5 ml PO Q6HP PRN 7 Days  ucup 

08/11/17 


Methylprednisolone [Medrol dosepack] 4 mg PO AS DIRECTED #1 angel 08/11/17 








- Past Medical/Surgical History


Diabetic: Yes


-: Diabetes


-: Depression


-: Asthma


-: Hypothyroidism


-: Appendectomy


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 

manager





- Family History


Family History: Reviewed- Non-Contributory





- Social History


Smoking Status: Former smoker


Alcohol use: No


CD- Drugs: No


Caffeine use: Yes





Review of Systems


10-point ROS is otherwise unremarkable





Physical Examination





- Physical Exam


General: Alert, In no apparent distress, Oriented x3, Other (Constantly moving 

legs erratically)


HEENT: Mucous membr. moist/pink, Sclerae nonicteric


Neck: Supple, No LAD


Respiratory: Clear to auscultation bilaterally, Normal air movement


Cardiovascular: No edema, Regular rate/rhythm, Normal S1 S2


Gastrointestinal: Soft and benign, Non-distended, No tenderness


Musculoskeletal: No erythema, No tenderness


Integumentary: No rashes


Neurological: Normal strength at 5/5 x4 extr, Cranial nerves 3-12 intact, Other 

(Seems off-balance), Abnormal speech (Slightly slurred)





- Studies


Laboratory Data (last 24 hrs)





09/06/20 11:45: PT 12.2, INR 1.03, APTT 31.6


09/06/20 11:45: WBC 9.4, Hgb 13.5 L, Hct 40.0, Plt Count 256


09/06/20 11:45: Sodium 140, Potassium 4.0, BUN 14, Creatinine 1.03, Glucose 332 

H, Total Bilirubin 0.2, AST 11 L, ALT 27, Alkaline Phosphatase 127 H








Assessment and Plan





- Advance Directives


Does patient have a Living Will: No


Does patient have a Durable POA for Healthcare: No





- Code Status/Comfort Care


Code Status Assessed: Yes


Code Status: Do Not Attempt Resuscitat


Physician Review Additional Text: 





Altered mental status (confusion/ decreased concentration), visual 

hallucinations


Restless leg syndrome


Hypothyroid


COPD


HTN


DM2


Anxiety





Altered mental status (confusion/ decreased concentration), visual 

hallucinations


-unclear etiology


-basic workup rather unremarkable in the ED


-denies taking any street drugs, or non-prescription drugs, and UDS negative


-possibly from inhaling fumes while he has been working on these boats for the 

past month without a mask/respirator


-possible Requip toxicity - however patient states he has not been taking any 

extra, do not want to immediately stop and cause dopamine withdrawal


-will check MRI, upper extremity movements: chronic akathisa vs ?chorea - 

possibly due to toxin exposures as stated above


-will monitor overnight on telemetry, will give very gentle IV fluid





Akathisia / Restless leg syndrome


-confirm requip dosage, will give lower dose





DM2


-continue home meds, insulin sliding scale and Accu-Cheks





Hypothyroid-confirm and continue home meds


HTN-confirm and continue home meds


Anxiety-continue home Duloxetine





Dispo: Obs overnight, telemetry, MRI, gentle IVF


Time Spent Managing Pts Care (In Minutes): 60

## 2020-09-06 NOTE — EDPHYS
Physician Documentation                                                                           

 [facility]                                                                 

Name: Jose D Demarco                                                                             

Age: 55 yrs                                                                                       

Sex: Male                                                                                         

: 1965                                                                                   

MRN: [id number]                                                                                   

Arrival Date: 2020                                                                          

Time: 11:41                                                                                       

Account#: [id number]                                                                            

Bed 17                                                                                            

Private MD:                                                                                       

ED Physician Vane Sommer                                                                    

HPI:                                                                                              

                                                                                             

13:12 This 55 yrs old  Male presents to ER via EMS with complaints of Hallucinations.ma2 

13:12 The patient presents with trouble concentrating. Onset: The symptoms/episode            ma2 

      began/occurred gradually, 1 day(s) ago. Associated signs and symptoms: Pertinent            

      negatives: agitation, blurred vision, confusion, diaphoresis, diarrhea, dizziness.          

      Patient's baseline: Neuro:. The patient has not experienced similar symptoms in the         

      past.                                                                                       

                                                                                                  

Historical:                                                                                       

- Allergies:                                                                                      

11:30 Aspirin;                                                                                rb1 

- Home Meds:                                                                                      

11:30 metformin 750 mg oral Tb24 1 tab twice a day [Active]; candesartan 16 mg oral tab 1 tab rb1 

      once daily [Active]; tamsulosin 0.4 mg oral cp24 1 cap once daily [Active]; ropinirole      

      2 mg oral tab 1 tab bedtime [Active]; duloxetine 60 mg oral cpDR 1 cap once daily           

      [Active]; meloxicam 7.5 mg oral tab 1 tab once daily [Active];                              

- PMHx:                                                                                           

11:30 Anemia; Asthma; Bronchitis; COPD; Diabetes - IDDM; Hypertension;                        rb1 

- PSHx:                                                                                           

11:30 Appendectomy;                                                                           rb1 

                                                                                                  

- Immunization history:: Adult Immunizations up to date.                                          

- Social history:: Smoking status: Reported history of juuling and/or vaping.                     

  Patient/guardian denies using alcohol, street drugs, The patient lives with family.             

- Family history:: not pertinent.                                                                 

                                                                                                  

                                                                                                  

ROS:                                                                                              

13:12 Constitutional: Negative for fever, chills, and weight loss.                            ma2 

13:12 All other systems are negative.                                                             

                                                                                                  

Exam:                                                                                             

13:12 Constitutional:  This is a well developed, well nourished patient who is awake, alert,  ma2 

      and in no acute distress. Head/Face:  Normocephalic, atraumatic. Eyes:  Pupils equal        

      round and reactive to light, extra-ocular motions intact.  Lids and lashes normal.          

      Conjunctiva and sclera are non-icteric and not injected.  Cornea within normal limits.      

      Periorbital areas with no swelling, redness, or edema. ENT:  Nares patent. No nasal         

      discharge, no septal abnormalities noted.  Tympanic membranes are normal and external       

      auditory canals are clear.  Oropharynx with no redness, swelling, or masses, exudates,      

      or evidence of obstruction, uvula midline.  Mucous membranes moist. Neck:  Trachea          

      midline, no thyromegaly or masses palpated, and no cervical lymphadenopathy.  Supple,       

      full range of motion without nuchal rigidity, or vertebral point tenderness.  No            

      Meningismus. Chest/axilla:  Normal chest wall appearance and motion.  Nontender with no     

      deformity.  No lesions are appreciated. Cardiovascular:  Regular rate and rhythm with a     

      normal S1 and S2.  No gallops, murmurs, or rubs.  Normal PMI, no JVD.  No pulse             

      deficits. Respiratory:  Lungs have equal breath sounds bilaterally, clear to                

      auscultation and percussion.  No rales, rhonchi or wheezes noted.  No increased work of     

      breathing, no retractions or nasal flaring. Abdomen/GI:  Soft, non-tender, with normal      

      bowel sounds.  No distension or tympany.  No guarding or rebound.  No evidence of           

      tenderness throughout. Back:  No spinal tenderness.  No costovertebral tenderness.          

      Full range of motion. MS/ Extremity:  Pulses equal, no cyanosis.  Neurovascular intact.     

       Full, normal range of motion. Neuro:  drowsy samnolent arousable to verbal stimulus ,      

      oriented to person, place, time, and situation.  Cranial nerves II-XII grossly intact.      

      Motor strength 5/5 in all extremities.  Sensory grossly intact.  Cerebellar exam            

      normal.  Normal gait.                                                                       

                                                                                                  

Vital Signs:                                                                                      

11:30  / 75; Pulse 81; Resp 17; Temp 97.7; Pulse Ox 96% on R/A; Weight 106.59 kg;       rb1 

      Height 5 ft. 11 in. (180.34 cm); Pain 0/10;                                                 

12:40  / 79; Pulse 84; Resp 18; Pulse Ox 100% ;                                         rb1 

13:38  / 92; Pulse 97; Resp 17; Pulse Ox 99% ;                                          rb1 

14:38  / 87; Pulse 67; Resp 19; Pulse Ox 98% on R/A;                                    rb1 

15:38  / 88; Pulse 71; Resp 17; Pulse Ox 99% ;                                          rb1 

16:22  / 87; Pulse 78; Resp 16; Pulse Ox 99% ;                                          rb1 

11:30 Body Mass Index 32.78 (106.59 kg, 180.34 cm)                                            rb1 

                                                                                                  

MDM:                                                                                              

11:41 Patient medically screened.                                                             ma2 

13:12 Differential Diagnosis: overdose, seizure, sepsis, UTI. Data reviewed: vital signs,     ma2 

      nurses notes. Counseling: I had a detailed discussion with the patient and/or guardian      

      regarding: the historical points, exam findings, and any diagnostic results supporting      

      the discharge/admit diagnosis, the presence of at least one elevated blood pressure         

      reading (>120/80) during this emergency department visit, the need for outpatient           

      follow up. Response to treatment: the patient's symptoms have markedly improved after       

      treatment.                                                                                  

                                                                                                  

                                                                                             

11:42 Order name: Acetaminophen; Complete Time: 13:                                         [facility] 

                                                                                             

11:42 Order name: Basic Metabolic Panel; Complete Time: 13:                                 [facility] 

                                                                                             

11:42 Order name: CBC with Diff; Complete Time: 13:                                         [facility] 

                                                                                             

11:42 Order name: ETOH Level; Complete Time: 13:                                            [facility] 

                                                                                             

11:42 Order name: Hepatic Function; Complete Time: 13:                                      [facility] 

                                                                                             

11:42 Order name: PT-INR; Complete Time: 13:                                                [facility] 

                                                                                             

11:42 Order name: Ptt, Activated; Complete Time: 13:                                        [facility] 

                                                                                             

11:42 Order name: Salicylate; Complete Time: 13:                                            [facility] 

                                                                                             

11:42 Order name: Urine Drug Screen; Complete Time: 18:14                                     [facility] 

                                                                                             

11:42 Order name: CT Head Brain wo Cont; Complete Time: 13:                                 [facility] 

                                                                                             

12:04 Order name: Glucose, Ancillary Testing; Complete Time: 13:                            [facility]

                                                                                             

13:03 Order name: Manual Differential; Complete Time: 13:06                                   [facility]

                                                                                             

14:22 Order name: Urine Dipstick--Ancillary (enter results); Complete Time: 18:14               

                                                                                             

14:56 Order name: Glucose, Ancillary Testing; Complete Time: 18:14                            [facility]

                                                                                             

11:42 Order name: EKG; Complete Time: 11:43                                                   ma2 

                                                                                             

11:42 Order name: EKG - Nurse/Tech; Complete Time: 14:07                                      [facility] 

                                                                                             

11:42 Order name: IV Saline Lock; Complete Time: 12:03                                        [facility] 

                                                                                             

11:42 Order name: Labs collected and sent; Complete Time: 12:03                               [facility] 

                                                                                             

11:42 Order name: Urine Dipstick-Ancillary (obtain specimen); Complete Time: 14:43            [facility] 

                                                                                                  

Administered Medications:                                                                         

11:55 Drug: NS 0.9% 1000 ml Route: IV; Rate: 1 bolus; Site: right antecubital;                rb1 

13:12 Drug: Insulin Regular Human 5 units {Co-Signature: bp (Deshawn Villalpando RN).} Route: IVP;  rb1 

      Site: right forearm;                                                                        

14:44 Follow up: Response: No adverse reaction; Blood sugar is lowered;                 rb1 

                                                                                                  

                                                                                                  

Disposition:                                                                                      

20 13:15 Hospitalization ordered by David Childress for Observation. Preliminary             

  diagnosis is Altered mental status, unspecified.                                                

- Bed requested for Telemetry/MedSurg (observation).                                              

- Status is Observation.                                                                      rb1 

- Condition is Stable.                                                                            

- Problem is new.                                                                                 

- Symptoms are unchanged.                                                                         

                                                                                                  

                                                                                                  

                                                                                                  

Signatures:                                                                                       

Dispatcher [employer]                           EDMS                                                 

Colby Orta, LAURENCE-C                      FNP-Cla1                                                  

Ashely Carmona, RN                     RN   rb1                                                  

Vane Sommer MD MD   ma2                                                  

Therese Shah                                                                              

Deshawn Villalpando RN                             bp                                                   

                                                                                                  

Corrections: (The following items were deleted from the chart)                                    

15:04 13:15 Hospitalization Ordered by David Childress MD for Observation. Preliminary          eb  

      diagnosis is Altered mental status, unspecified. Bed requested for Telemetry/MedSurg        

      (observation). Status is Observation. Condition is Stable. Problem is new. Symptoms are     

      unchanged. [facility]                                                                              

18:30 15:04 2020 13:15 Hospitalization Ordered by David Childress MD for Observation.     rb1 

      Preliminary diagnosis is Altered mental status, unspecified. Bed requested for              

      Telemetry/MedSurg (observation). Status is Observation. Condition is Stable. Problem is     

      new. Symptoms are unchanged. eb                                                             

                                                                                                  

**************************************************************************************************

## 2020-09-06 NOTE — RAD REPORT
EXAM DESCRIPTION:  CT - Head Brain Wo Cont - 9/6/2020 12:22 pm

 

CLINICAL HISTORY:  Alteration of awareness/confusion

 

COMPARISON:  None

 

TECHNIQUE:  Computed axial tomography of the head was obtained. IV contrast was not requested.

 

All CT scans are performed using dose optimization technique as appropriate and may include automated
 exposure control or mA/KV adjustment according to patient size.

 

FINDINGS:  An intracranial  bleed is not seen .

 

The ventricles are normal in caliber.

 

No extra-axial fluid collection is noted.

 

Opacification left maxillary sinus. Moderate opacification ethmoid sinus. Compatible with chronic sin
usitis. Fluid mastoids is not

 

IMPRESSION:  No acute intracranial abnormality is seen. If patient's symptoms persist  MRI of the bra
in would be recommended.

## 2020-09-06 NOTE — ER
Nurse's Notes                                                                                     

 [facility]                                                                 

Name: Jose D Demarco                                                                             

Age: 55 yrs                                                                                       

Sex: Male                                                                                         

: 1965                                                                                   

MRN: [id number]                                                                                   

Arrival Date: 2020                                                                          

Time: 11:41                                                                                       

Account#: [id number]                                                                            

Bed 17                                                                                            

Private MD:                                                                                       

Diagnosis: Altered mental status, unspecified                                                     

                                                                                                  

Presentation:                                                                                     

                                                                                             

11:30 Chief complaint: EMS states: Pt. 55 yr. old, c/o visual hallucinations, denies hearing  rb1 

      voices. Denies wanting to hurt himself or others. C/o his restless leg syndrome             

      bothering him. Stroke scale was negative, vital signs were stable,  \T\ then 320.     

      Levothyroxine was recently reduced to 75 mcg. Pt. appears to be confused with slurred       

      speech. Coronavirus screen: Client denies travel out of the [place] in the last 14 days.       

      Ebola Screen: Patient denies travel to an Ebola-affected area in the 21 days before         

      illness onset. Initial Sepsis Screen: Does the patient meet any 2 criteria? No.             

      Patient's initial sepsis screen is negative. Does the patient have a suspected source       

      of infection? No. Patient's initial sepsis screen is negative. Risk Assessment: Do you      

      want to hurt yourself or someone else? Patient reports no desire to harm self or            

      others. Onset of symptoms was 2020.                                           

11:30 Method Of Arrival: EMS: [place] [id number] 

11:30 Acuity: GEORGETTE 3                                                                           rb1 

                                                                                                  

Triage Assessment:                                                                                

11:30 General: Appears in no apparent distress. Behavior is restless, pt. reports visual      rb1 

      hallucinations. Pain: Denies pain. Neuro: Level of Consciousness is obeys commands,         

      confused, lethargic, Oriented to person, place, time, situation,  are equal            

      bilaterally Moves all extremities. Gait is unable to obtain. Speech is slurred, Facial      

      symmetry appears normal, Pupils are PERRLA. Neuro: Level of Consciousness is.               

      Cardiovascular: Capillary refill < 3 seconds. Respiratory: Airway is patent Respiratory     

      effort is even, unlabored, Respiratory pattern is regular, symmetrical. GI: No signs        

      and/or symptoms were reported involving the gastrointestinal system. : No signs           

      and/or symptoms were reported regarding the genitourinary system. Derm: Skin is pink,       

      warm \T\ dry.                                                                               

                                                                                                  

Historical:                                                                                       

- Allergies:                                                                                      

11:30 Aspirin;                                                                                rb1 

- Home Meds:                                                                                      

11:30 metformin 750 mg oral Tb24 1 tab twice a day [Active]; candesartan 16 mg oral tab 1 tab rb1 

      once daily [Active]; tamsulosin 0.4 mg oral cp24 1 cap once daily [Active]; ropinirole      

      2 mg oral tab 1 tab bedtime [Active]; duloxetine 60 mg oral cpDR 1 cap once daily           

      [Active]; meloxicam 7.5 mg oral tab 1 tab once daily [Active];                              

- PMHx:                                                                                           

11:30 Anemia; Asthma; Bronchitis; COPD; Diabetes - IDDM; Hypertension;                        rb1 

- PSHx:                                                                                           

11:30 Appendectomy;                                                                           rb1 

                                                                                                  

- Immunization history:: Adult Immunizations up to date.                                          

- Social history:: Smoking status: Reported history of juuling and/or vaping.                     

  Patient/guardian denies using alcohol, street drugs, The patient lives with family.             

- Family history:: not pertinent.                                                                 

                                                                                                  

                                                                                                  

Screenin:30 Abuse screen: Denies threats or abuse. Nutritional screening: No deficits noted.        rb1 

      Tuberculosis screening: No symptoms or risk factors identified. Fall Risk No fall in        

      past 12 months (0 pts). No secondary diagnosis (0 pts). IV access (20 points).              

      Ambulatory Aid- None/Bed Rest/Nurse Assist (0 pts). Gait- Normal/Bed Rest/Wheelchair (0     

      pts) Mental Status- Overestimates/Forgets Limitations (15 pts.). Total Rubio Fall Scale     

      indicates Low Risk Score (25-44 pts). Fall prevention measures have been instituted.        

      Side Rails Up X 2 Placed close to Nursing Station 1:1 attendant Assigned to Pt.             

      Frequent Obs/Assesments occuring As available Patient and Family Educated on Fall           

      Prevention Program and strategies.                                                          

                                                                                                  

Assessment:                                                                                       

11:30 General: See triage assessment.                                                         rb1 

12:18 Reassessment: Patient appears in no apparent distress at this time. No changes from     rb1 

      previously documented assessment.                                                           

13:15 Reassessment: Pt. is very restless and complains of restless leg syndrome. He stated,   rb1 

      "I have trouble sitting still.".                                                            

14:09 Reassessment: Pt. is resting with eyes closed, respirations even, unlabored.            rb1 

14:45 Reassessment: Patient appears in no apparent distress at this time. Patient and/or      rb1 

      family updated on plan of care and expected duration. Pain level reassessed. Patient is     

      alert, oriented x 3, equal unlabored respirations, skin warm/dry/pink. Patient denies       

      pain at this time.                                                                          

16:00 Reassessment: Patient appears in no apparent distress at this time. Patient and/or      rb1 

      family updated on plan of care and expected duration. Pain level reassessed. Patient is     

      alert, oriented x 3, equal unlabored respirations, skin warm/dry/pink.                      

17:01 Reassessment: PT EXITED ROOM "I BEEN WAITING TOO LONG, I'M OUTTA HERE." PT ELOPED       bp  

      WITHOUT WAITING FOR STAFF OR MD, REFUSED TO WAIT FOR AMA PAPERWORK. PT LAST SEEN IN         

      STABLE CONDITION, WALKING WITH STEADY GAIT.                                                 

18:40 Reassessment: Spoke with Mrs. Demarco via telephone. She explained that her   arvind 

      left the ED and she was not able to get him to return to the ED. She reports that the       

      pt still had the IV in place and that she would be fine to remove the IV. I told her        

      that we would be more than happy to remove the IV for her and she stated, "There is no      

      way he will come back up there. He is very stubborn. I feel comfortable enough to           

      remove it myself." I educated her on the removal and she verbalized understanding. She      

      wanted the pt. lab results and I explained that I was unable to give results over the       

      phone and she verbalized that she understood.                                               

                                                                                                  

Vital Signs:                                                                                      

11:30  / 75; Pulse 81; Resp 17; Temp 97.7; Pulse Ox 96% on R/A; Weight 106.59 kg;       rb1 

      Height 5 ft. 11 in. (180.34 cm); Pain 0/10;                                                 

12:40  / 79; Pulse 84; Resp 18; Pulse Ox 100% ;                                         rb1 

13:38  / 92; Pulse 97; Resp 17; Pulse Ox 99% ;                                          rb1 

14:38  / 87; Pulse 67; Resp 19; Pulse Ox 98% on R/A;                                    rb1 

15:38  / 88; Pulse 71; Resp 17; Pulse Ox 99% ;                                          rb1 

16:22  / 87; Pulse 78; Resp 16; Pulse Ox 99% ;                                          rb1 

11:30 Body Mass Index 32.78 (106.59 kg, 180.34 cm)                                            [facility] 

                                                                                                  

ED Course:                                                                                        

11:30 Arm band placed on left wrist.                                                          rb1 

11:30 Patient has correct armband on for positive identification. Placed in gown. Bed in low  rb1 

      position. Call light in reach. Side rails up X2. Cardiac monitor on. Pulse ox on. NIBP      

      on.                                                                                         

11:41 Patient arrived in ED.                                                                  rb1 

11:41 Vane Sommer MD is Attending Physician.                                           ma2 

11:50 Inserted saline lock: 22 gauge in right antecubital area, using aseptic technique.      rb1 

      Blood collected.                                                                            

12:09 Triage completed.                                                                       rb1 

12:22 CT Head Brain wo Cont In Process Unspecified.                                           EDMS

12:39 CT completed. Patient tolerated procedure well. Patient moved back from CT.             bq  

12:45 IV discontinued, intact, bleeding controlled, No redness/swelling at site. Pressure     rb1 

      dressing applied, Catheter kinked due to pt bending his arm.                                

12:50 Inserted saline lock: 22 gauge in right forearm, using aseptic technique.               rb1 

13:14 Vane Sommer MD is Hospitalizing Provider.                                        ma2 

13:15 David Childress MD is Hospitalizing Provider.                                           ma2 

                                                                                                  

Administered Medications:                                                                         

11:55 Drug: NS 0.9% 1000 ml Route: IV; Rate: 1 bolus; Site: right antecubital;                rb1 

13:12 Drug: Insulin Regular Human 5 units {Co-Signature: bp (Deshawn Villalpando RN).} Route: IVP;  rb1 

      Site: right forearm;                                                                        

14:44 Follow up: Response: No adverse reaction; Blood sugar is lowered;                 rb1 

                                                                                                  

                                                                                                  

Intake:                                                                                           

                                                                                                  

Outcome:                                                                                          

13:15 Decision to Hospitalize by Provider.                                                    ma2 

17:00 Patient left the ED.                                                                    rb1 

17:00 Eloped from patient exam room, after seeing physician                                   rb1 

17:00 Condition: stable                                                                           

                                                                                                  

Signatures:                                                                                       

Dispatcher [employer]                           EDMS                                                 

Randi Fuller                                                                                   

Karen Collins, RN                     RN   aa5                                                  

Ashely Carmona RN                     RN   Deshawn Woods RN                      RN   bp                                                   

Vane Sommer MD MD ma2 Brian Peltier RN                             bp                                                   

                                                                                                  

Corrections: (The following items were deleted from the chart)                                    

12:48 11:25 Inserted saline lock: 22 gauge in right antecubital area, using aseptic           rb1 

      technique. Blood collected. rb1                                                             

18:41 18:30 Patient left the ED. rb1                                                          rb1 

19:08 11:53 Karen Collins, RN is Primary Nurse. aa5                                         mahendra 

                                                                                                  

**************************************************************************************************

## 2020-09-07 VITALS — OXYGEN SATURATION: 98 % | DIASTOLIC BLOOD PRESSURE: 87 MMHG | SYSTOLIC BLOOD PRESSURE: 130 MMHG

## 2020-09-08 NOTE — EKG
Test Date:    2020-09-06               Test Time:    12:53:47

Technician:   SHARLA                                    

                                                     

MEASUREMENT RESULTS:                                       

Intervals:                                           

Rate:         77                                     

PA:           168                                    

QRSD:         92                                     

QT:           362                                    

QTc:          409                                    

Axis:                                                

P:            34                                     

PA:           168                                    

QRS:          75                                     

T:            55                                     

                                                     

INTERPRETIVE STATEMENTS:                                       

                                                     

Normal sinus rhythm with sinus arrhythmia

Normal ECG

Compared to ECG 02/27/2018 23:59:19

No significant changes



Electronically Signed On 09-08-20 19:59:46 CDT by Mikey Joseph

## 2020-12-09 LAB
BUN BLD-MCNC: 13 MG/DL (ref 7–18)
GLUCOSE SERPLBLD-MCNC: 139 MG/DL (ref 74–106)
HCT VFR BLD CALC: 45.1 % (ref 39.6–49)
INR BLD: 0.99
LYMPHOCYTES # SPEC AUTO: 1.9 K/UL (ref 0.7–4.9)
PMV BLD: 8.7 FL (ref 7.6–11.3)
POTASSIUM SERPL-SCNC: 4 MMOL/L (ref 3.5–5.1)
RBC # BLD: 5.34 M/UL (ref 4.33–5.43)

## 2020-12-09 NOTE — RAD REPORT
EXAM DESCRIPTION:  RAD - Chest Pa And Lat (2 Views) - 12/9/2020 5:20 pm

 

CLINICAL HISTORY:  preop

Chest pain.

 

COMPARISON:  Chest Pa And Lat (2 Views) dated 2/18/2019; Chest Single View dated 2/27/2018; Chest Pa 
And Lat (2 Views) dated 8/10/2017; CHEST PA AND LAT 2 VIEW dated 8/29/2014

 

FINDINGS:  The lungs are clear. The heart is normal in size. No displaced fractures.

 

IMPRESSION:  No acute or concerning finding suspected.

## 2020-12-16 ENCOUNTER — HOSPITAL ENCOUNTER (OUTPATIENT)
Dept: HOSPITAL 97 - OR | Age: 55
Discharge: HOME | End: 2020-12-16
Attending: UROLOGY
Payer: COMMERCIAL

## 2020-12-16 VITALS — SYSTOLIC BLOOD PRESSURE: 115 MMHG | DIASTOLIC BLOOD PRESSURE: 68 MMHG | OXYGEN SATURATION: 98 % | TEMPERATURE: 96.8 F

## 2020-12-16 DIAGNOSIS — J44.9: ICD-10-CM

## 2020-12-16 DIAGNOSIS — N32.3: ICD-10-CM

## 2020-12-16 DIAGNOSIS — E03.9: ICD-10-CM

## 2020-12-16 DIAGNOSIS — N40.1: ICD-10-CM

## 2020-12-16 DIAGNOSIS — Z79.84: ICD-10-CM

## 2020-12-16 DIAGNOSIS — F32.9: ICD-10-CM

## 2020-12-16 DIAGNOSIS — E11.9: ICD-10-CM

## 2020-12-16 DIAGNOSIS — Z20.828: ICD-10-CM

## 2020-12-16 DIAGNOSIS — J45.998: ICD-10-CM

## 2020-12-16 DIAGNOSIS — K21.9: ICD-10-CM

## 2020-12-16 DIAGNOSIS — Z87.891: ICD-10-CM

## 2020-12-16 DIAGNOSIS — I10: ICD-10-CM

## 2020-12-16 DIAGNOSIS — G95.9: ICD-10-CM

## 2020-12-16 DIAGNOSIS — R33.8: ICD-10-CM

## 2020-12-16 DIAGNOSIS — N21.0: Primary | ICD-10-CM

## 2020-12-16 DIAGNOSIS — G25.81: ICD-10-CM

## 2020-12-16 PROCEDURE — 87086 URINE CULTURE/COLONY COUNT: CPT

## 2020-12-16 PROCEDURE — 80048 BASIC METABOLIC PNL TOTAL CA: CPT

## 2020-12-16 PROCEDURE — 71046 X-RAY EXAM CHEST 2 VIEWS: CPT

## 2020-12-16 PROCEDURE — 85730 THROMBOPLASTIN TIME PARTIAL: CPT

## 2020-12-16 PROCEDURE — 82360 CALCULUS ASSAY QUANT: CPT

## 2020-12-16 PROCEDURE — 53899 UNLISTED PX URINARY SYSTEM: CPT

## 2020-12-16 PROCEDURE — 36415 COLL VENOUS BLD VENIPUNCTURE: CPT

## 2020-12-16 PROCEDURE — 87088 URINE BACTERIA CULTURE: CPT

## 2020-12-16 PROCEDURE — 88305 TISSUE EXAM BY PATHOLOGIST: CPT

## 2020-12-16 PROCEDURE — 0TCB8ZZ EXTIRPATION OF MATTER FROM BLADDER, VIA NATURAL OR ARTIFICIAL OPENING ENDOSCOPIC: ICD-10-PCS

## 2020-12-16 PROCEDURE — 82947 ASSAY GLUCOSE BLOOD QUANT: CPT

## 2020-12-16 PROCEDURE — 52318 REMOVE BLADDER STONE: CPT

## 2020-12-16 PROCEDURE — 85610 PROTHROMBIN TIME: CPT

## 2020-12-16 PROCEDURE — 85025 COMPLETE CBC W/AUTO DIFF WBC: CPT

## 2020-12-16 PROCEDURE — 0VB08ZZ EXCISION OF PROSTATE, VIA NATURAL OR ARTIFICIAL OPENING ENDOSCOPIC: ICD-10-PCS

## 2020-12-16 NOTE — OP
Surgeon:  KOFI DIAS



Preoperative Diagnoses:  

1.Bladder calculi.

2.Urinary retention.

3.Benign prostatic hypertrophy with obstruction.



Postoperative Diagnoses:  

1.Bladder calculi.

2.Urinary retention.

3.Benign prostatic hypertrophy with obstruction.



Principal Procedure:  

1.Cysto litholapaxy.

2.Holmium laser ablation of the prostate converted to bipolar transurethral resection of the prostat
e.



Indication For Procedure:  Mr. Demarco presented to the [facility]rology Clinic with urinary retention requ
iring intermittent catheterization.  He also has had a significant degree of discomfort associated wi
th catheterizing that has resulted in him taking multiple antimicrobial therapy courses.  After an as
sessment made in the office revealing the presence of the bladder calculi likely the underlying sourc
e of his discomfort, surgical intervention was recommended.  Given the obstruction from his prostate 
and the absence of any large scale neurologic findings, it was reasonable to also proceed with manage
ment of obstruction by the prostate though the potential for underlying neuropathic bladder dysfuncti
on did exist.



Procedure In Detail:  The patient was consented in the preoperative holding area before being transfe
rred to the operative suite where general anesthesia was induced.  He was given ampicillin and gentam
icin IV, antimicrobial prophylaxis.  Pneumo boots were provided for DVT prophylaxis.  He was placed i
n the lithotomy position, padded and secured to the table appropriately.  His genitalia were prepped 
using Hibiclens, and he was draped in standard fashion.  The case was begun using urethral sounds to 
dilate his meatus and fossa navicularis to 30-Irish.  Once this was done, using the 26-Irish resect
oscope and a visual obturator, the urethra was traversed and the bladder entered.  As noted in the ou
tpatient setting, there was significant elevation of the median bar as well as some lateral lobar hyp
ertrophy of the prostate likely resulting in obstruction.  Upon entry into the bladder, there were in
numerable, much greater than 20 or 30, bladder calculi, each approximately 0.5 cm in diameter.  I thu
s employed the stone  device to  and crush each 1 of those stones until I was able to e
vacuate them from the bladder using an [employer] evacuator.  Fortunately, the stones were relatively soft
 in composition and did break with relative ease.  Multiple rounds of crushing and evacuation were re
quired in order to clear his bladder of the stone burden and debris.  I then turned my attention to t
he prostate.  Using a 550 nm laser fiber side fire and 80 joules of power accomplished using 24 Hz an
d 3.X joules.  I then began to fulgurate and vaporize the prostate at the median lobe.  I was able to
 resect the component of it before the laser machine began to repetitively fail despite multiple repe
titive attempts.  As a result, I switched to using the bipolar loop electrode and completed the resec
tion of the prostate as a standard bipolar TURP.  The median lobe was resected in its entirety down t
o the verumontanum, and the lateral lobes were both resected sequentially until a nice channel was cr
eated from the verumontanum into the bladder neck.  All the chips were Ellik evacuated from the bladd
er, and careful attention was made to fulgurate any bleeding vessels.  In the end, the prostatic ilene
a was completely hemostatic with the bladder decompressed, and so a 22-Irish 3-way Hill catheter wa
s placed into his bladder with ease with 30 cc of sterile water in the balloon.  The catheter was the
n placed to moderate traction and the patient was awakened from general anesthesia before being trans
ferred to a stretcher and then to the recovery room in good condition.



Complications:  None.



Discharge Disposition:  I will have him follow up on Friday for a voiding trial in the clinic, and if
 he is successfully able to void, the majority of fluid instilled in a fill, pull, void, then the cat
heter should be left out.  If he is unable to successfully void, I would recommend reinsertion shin o
f an 18 or 20-Irish coude Hill catheter and give him additional time and bladder rest along with freddy rangel with me within 2 weeks to discuss the results of the pathology of the bladder chips specimen. 
 I would not have him immediately begin CIC since with the fresh prostate fossa after resection, ther
e may be difficulty catheterizing or he may cause 

trauma.  He may take Azo starting the day of the scheduled voiding trial for dysuria.





APRIL/FRANCISCO

DD:  12/16/2020 15:23:09Voice ID:  [id number]

DT:  12/16/2020 15:48:55Report ID:  [id number]

## 2020-12-16 NOTE — XMS REPORT
Clinical Summary

                          Created on:2020



Patient:Jose D Demarco

Sex:Male

:1965

External Reference #:[id number]





Demographics







                          Address                   [address] [place] [address]

 

                          Home Phone                [phone]

 

                          Mobile Phone              [phone]

 

                          Email Address             [email or web address]

 

                          Preferred Language        English

 

                          Marital Status            

 

                          Islam Affiliation     Unknown

 

                          Race                      White

 

                          Ethnic Group              Not  or 









Author







                          Organization              [facility]

 

                          Address                   [address] [place] [address]









Support







                Name            Relationship    Address         Phone

 

                Radha Demarco Child           Unavailable     [phone]

[phone]

 

                Lawanda Demarco Spouse          Unavailable     [phone]









Care Team Providers







                    Name                Role                Phone

 

                    Asked, No Pcp       Primary Care Provider Unavailable









Allergies

No Known Active Allergies



Medications







          Medication Sig       Dispensed Refills   Start Date End Date  Status

 

          ciprofloxacin (CIPRO) Take 500 mg by           0                      

       Active



          500 MG tablet mouth 2 (two)                                         



                    times a day.                                         

 

          rOPINIRole (REQUIP) 3 MG Take 3 mg by           0                     

        Active



          tablet    mouth nightly.                                         

 

          DULoxetine (CYMBALTA) 60 Take 60 mg by           0                    

         Active



          MG capsule mouth daily.                                         

 

          gabapentin (NEURONTIN) Take 300 mg by           0                     

        Active



          300 mg capsule mouth 2 (two)                                         



                    times a day.                                         

 

          candesartan (ATACAND) 16 Take 16 mg by           0                    

         Active



          MG tablet mouth daily.                                         

 

          metFORMIN (GLUCOPHAGE) Take 1,000 mg by           0                   

          Active



          1,000 mg tablet mouth 2 (two)                                         



                    times a day with                                         



                    meals.                                            

 

          levothyroxine Take 75 mcg by           0                             A

ctive



          (SYNTHROID) 75 mcg mouth daily.                                       

  



          tablet                                                      

 

          tamsulosin (FLOMAX) 0.4 Take 0.4 mg by           0                    

         Active



          mg capsule mouth daily.                                         

 

          insulin   Inject 40 Units           0                             Acti

ve



          glargine,hum.rec.anlog under the skin                                 

        



          (TOUJEO SOLOSTAR U-300 daily.                                         

   



          INSULIN SUBQ)                                                   







Active Problems

Not on file



Encounters







             Date         Type         Specialty    Care Team    Description

 

             2020   Emergency    Emergency Medicine Kurt Quiroz retention 

(Primary Dx);



                                                    DO Darshan   Hematuria, unsp

ecified type;



                                                                 Bladder stone

 

             2020   Travel                                 



after 2019



Medical History







                    Medical History     Date                Comments

 

                    Bladder stones                          

 

                    Hypertension                            

 

                    Diabetes mellitus (HCC)                     







Social History







             Tobacco Use  Types        Packs/Day    Years Used   Date

 

             Never Assessed                                        









                          Sex Assigned at Birth     Date Recorded

 

                          Not on file               









                    Job Start Date      Occupation          Industry

 

                    Not on file         Not on file         Not on file









                    COVID-19 Exposure   Response            Date Recorded

 

                    In the last month, have you been in contact with No / Unsure

         2020  3:21 AM

CST



                    someone who was confirmed or suspected to have              

       



                    Coronavirus / COVID-19?                     







Last Filed Vital Signs







                Vital Sign      Reading         Time Taken      Comments

 

                Blood Pressure  171/86          2020  6:30 AM CST 

 

                Pulse           98              2020  6:30 AM CST 

 

                Temperature     36.6 C (97.9 F) 2020  2:22 AM CST 

 

                Respiratory Rate 18              2020  6:30 AM CST 

 

                Oxygen Saturation 98%             2020  6:30 AM CST 

 

                Inhaled Oxygen Concentration -               -               

 

                Weight          99.8 kg (220 lb) 2020  2:34 AM CST 

 

                Height          180.3 cm (5' 11") 2020  2:34 AM CST 

 

                Body Mass Index 30.68           2020  2:34 AM CST 







Plan of Treatment

Not on file



Procedures







             Procedure Name Priority     Date/Time    Associated   Comments



                                                    Diagnosis    

 

             URINE CULTURE STAT         2020  4:12              Results fo

r this



                                       AM CST                    procedure are i

n



                                                                 the results



                                                                 section.

 

             URINALYSIS SCREEN AND STAT         2020  3:21              Re

sults for this



             MICROSCOPY, WITH REFLEX              AM CST                    proc

edure are in



             TO CULTURE                                          the results



                                                                 section.

 

             PARTIAL THROMBOPLASTIN STAT         2020  3:21              R

esults for this



             TIME (PTT)                AM CST                    procedure are i

n



                                                                 the results



                                                                 section.

 

             PROTHROMBIN TIME WITH STAT         2020  3:21              Re

sults for this



             INR                       AM CST                    procedure are i

n



                                                                 the results



                                                                 section.

 

             HC COMPLETE BLD COUNT STAT         2020  3:21              Re

sults for this



             W/AUTO DIFF               AM CST                    procedure are i

n



                                                                 the results



                                                                 section.

 

             ESTIMATED GFR STAT         2020  2:35              Results fo

r this



                                       AM CST                    procedure are i

n



                                                                 the results



                                                                 section.

 

             BASIC METABOLIC PANEL STAT         2020  2:35              Re

sults for this



                                       AM CST                    procedure are i

n



                                                                 the results



                                                                 section.



after 2019



Results

Urine culture (2020  4:12 AM CST)





             Component    Value        Ref Range    Performed At Pathologist



                                                                 Signature

 

             Urine culture No growth after 24 hours              [facility]

ST 



             isolate      Mixed miller <=10-3 col/cc              HOSPITAL     



                                                                 



                          Comment:                               



                          Specimen Information                           



                          Specimen Source: Urine                           



                          Specimen Site: Catheterized                           



                                                                 









                                        Specimen

 

                                        Urine - Catheterized









                Performing Organization Address         City/[facility]/ZIP Code Phon

e Number

 

                [facility] DEPARTMENT OF PATHOLOGY AND [address] [place] [address]

0 



                [address] [address] 



Urinalysis screen and microscopy, with reflex to culture (2020  3:21 AM 
CST)





             Component    Value        Ref Range    Performed At Pathologist



                                                                 Signature

 

             Specimen site Catheterized              [facility]     

 

             Color, UA    [place]                     [facility]     

 

             Appearance, UA Hazy                      [facility]     

 

             Specific gravity, 1.041 (H)    1.001 - 1.035 [facility]     

 

             pH, UA       6.0          5.0 - 8.5    [facility]     

 

             Protein, UA  3+ (A)       Negative     [facility]     

 

             Glucose, UA  3+ (A)       Negative     [facility]     

 

             Ketones, UA  Negative     Negative     [facility]     

 

             Bilirubin, UA Positive@UBIL (A) Negative     [facility]     

 

             Blood, UA    Moderate (A) Negative     [facility]     

 

             Nitrite, UA  Negative     Negative     [facility]     

 

             Urobilinogen, UA <2.0         <2.0         [facility]     

 

             Leukocyte    Moderate (A) Negative     [facility] 



             esterase,                            HOSPITAL     

 

             Epithelial cells, 1            /HPF         [facility]     

 

             WBC, UA      >180 (H)     0 - 1 /HPF   [facility]     

 

             RBC, UA      >180 (H)     0 - 5 /HPF   [facility]     

 

             Bacteria, UA Few          None seen    [facility]     

 

             WBC clumps, UA Few (A)                   [facility]     

 

             Yeast, UA    None seen                 [facility]     

 

             Yeast with   None seen                 [facility] 



             pseudohyphae, [facility]     









                                        Specimen

 

                                        Urine









                Performing Organization Address         City/[facility]/ZIP Code Phon

e Number

 

                [facility] DEPARTMENT OF PATHOLOGY AND [address] [place] [address]

0 



                [address] [address] 



Partial thromboplastin time, activated (2020  3:21 AM CST)





             Component    Value        Ref Range    Performed At Pathologist



                                                                 Signature

 

             PTT          28.4         23.0 - 36.0  [facility] 



                          Comment:     [facility]     



                          PTT therapeutic range for unfractionated heparin is   

                        



                          61.0-112.0 seconds which corresponds to Anti-Xa       

                    



                          0.3-0.7 U/ml.                           



                                                                 









                                        Specimen

 

                                        Blood









                Performing Organization Address         City/[facility]/ZIP Code Phon

e Number

 

                [facility] DEPARTMENT OF PATHOLOGY AND [address] [place] [address]

0 



                [facility] [address] [place] [address] 



Prothrombin time with INR (2020  3:21 AM CST)





             Component    Value        Ref Range    Performed At Pathologist



                                                                 [facility]

 

             Prothrombin time 14.3         11.5 - 14.5  [facility]     

 

             INR          1.1                       [facility]      



                          Comment:                  [facility]    



                          The International Normalized Ratio (INR) is a therapeu

[facility]              HOSPITAL     



                          monitoring tool for patients who are stable on oral   

                        



                          anticoagulant therapy. An INR of 2.0-3.0 is suggested 

for deep                           



                          vein thrombosis/pulmonary embolism.                   

        



                                                                 









                                        Specimen

 

                                        Blood









                Performing Organization Address         City/[facility]/ZIP Code Phon

e Number

 

                [facility] DEPARTMENT OF PATHOLOGY AND [address] [place], [place] [address]

0 



                [facility] [address] [place] [address] 



CBC with platelet and differential (2020  3:21 AM CST)





             Component    Value        Ref Range    Performed At Pathologist



                                                                 Signature

 

             WBC          7.39         4.50 - 11.00 [facility] 



                                       k/uL         HOSPITAL     

 

             RBC          5.17         4.40 - 6.00  [facility] 



                                       m/uL         [facility]     

 

             HGB          14.3         14.0 - 18.0  [facility] 



                                       g/dL         [facility]     

 

             HCT          44.3         41.0 - 51.0 % [facility]     

 

             MCV          85.7         82.0 - 100.0 [facility]     

 

             MCH          27.7         27.0 - 34.0 pg [facility]     

 

             MCHC         32.3         31.0 - 37.0  [facility] 



                                       g/[facility]     

 

             RDW - SD     38.6         37.0 - 55.0 fL [facility]     

 

             MPV          10.2         8.8 - 13.2 fL [facility]     

 

             Platelet count 325          150 - 400 k/uL [facility]     

 

             Nucleated RBC 0.00         /100 WBC     [facility]     

 

             Neutrophils  60.0         39.0 - 69.0 % [facility]     

 

             Lymphocytes  25.4         25.0 - 45.0 % [facility]     

 

             Monocytes    7.6          0.0 - 10.0 % [facility]     

 

             Eosinophils  5.3 (H)      0.0 - 5.0 %  [facility]     

 

             Basophils    1.4 (H)      0.0 - 1.0 %  [facility]     

 

             Immature granulocytes 0.3Comment:  0.0 - 1.0 %  [facility] 



                          "Immature                 HOSPITAL     



                          granulocytes"                           



                          (promyelocytes                           



                          , myelocytes,                           



                          metamyelocytes                           



                          )                                      









                                        Specimen

 

                                        Plasma









                Performing Organization Address         City/[facility]/ZIP Code Phon

e Number

 

                [facility] DEPARTMENT OF PATHOLOGY AND [address] [place] [address]

0 



                [address] [address] 



Estimated GFR (2020  2:35 AM CST)





             Component    Value        Ref Range    Performed At Pathologist



                                                                 Signature

 

             Estimated GFR 88           mL/min/1.73  [facility] 



                          Comment:     m2           HOSPITAL     



                          Catergory Units  Interpretation                 

          



                          G1     >=90   Normal or high              

             



                          G2     60-89  Mildly decreased            

               



                          G3a    45-59  Mildly to moderately decreas

ed                           



                          G3b    30-44  Moderately to severely decre

ased                           



                          G4     15-29  Severely decreased          

                 



                          G5     <15   Kidney failure             

              



                          The eGFR was calculated using the Chronic Kidney Disea

se                           



                          Epidemiology Collaboration (CKD-EPI) equation.        

                   



                          Interpretation is based on recommendations of the     

                      



                          National Kidney Foundation-Kidney Disease Outcomes [place]

lity                           



                          Initiative (NKF-KDOQI) published in 2014.             

              



                                                                 









                                        Specimen

 

                                        Plasma









                Performing Organization Address         City/[facility]/ZIP Code Phon

e Number

 

                [facility] DEPARTMENT OF PATHOLOGY AND [address] [place], [place] [address]

0 



                [address] [address] 



Basic metabolic panel (2020  2:35 AM CST)





             Component    Value        Ref Range    Performed At Pathologist Sig

nature

 

             Sodium       137          135 - 148 mEq/L [facility]

L 

 

             Potassium    4.0          3.5 - 5.0 mEq/L [facility]

L 

 

             Chloride     100          98 - 112 mEq/L [facility]

 

 

             CO2          24           24 - 31 mEq/L [facility] 

 

             Anion gap    13@ANIO      7 - 15 mEq/L [facility] 

 

             BUN          16           6 - 20 mg/dL [facility] 

 

             Creatinine   0.96         0.70 - 1.20 mg/dL [facility]

[facility] 

 

             Glucose      264 (H)      65 - 99 mg/dL [facility] 

 

             Calcium      9.0          8.3 - 10.2 mg/dL [facility]

AL 









                                        Specimen

 

                                        Plasma









                Performing Organization Address         City/[facility]/ZIP Code Phon

e Number

 

                [facility] DEPARTMENT OF PATHOLOGY AND [address] [place], [place] [address]

0 



                [address] [address] 



after 2019



Insurance







          Payer     Benefit Plan / Group Subscriber ID Effective Dates Phone    

 Address   Type

 

          [employer]      [id number] 2017-Present      

               HMO









           Guarantor Name Account Type Relation to Date of    Phone      Billing



                                 Patient    Birth                 Address

 

           Jose D Demarco Personal/Family Self       1965 [phone] [address]

[address] [facility] [address]



                                                       (Home)     [address]







                                                                  [place]



                                                                  [address]







Advance Directives

For more information, please contact: [phone]





                Type            Date Recorded   Patient Representative Explanati

on

 

                Advance Directives, Living 2020  3:25 AM                 



                Will and Medical Power of

## 2020-12-16 NOTE — XMS REPORT
Continuity of Care Document

                          Created on:2020



Patient:PAPA HEALY

Sex:Male

:1965

External Reference #:[id number]





Demographics







                          Address                   [address]



                                                    [place] [address]

 

                          Home Phone                [phone]

 

                          Mobile Phone              [phone]

 

                          Email Address             [email or web address]

 

                          Preferred Language        English

 

                          Marital Status            Unknown

 

                          Mandaen Affiliation     Unknown

 

                          Race                      Unknown

 

                          Additional Race(s)        Unavailable



                                                    White

 

                          Ethnic Group              Unknown









Author







                          Organization              [facility]

t

 

                          Address                   [address] [place] [address] [address]



                                                    [place] [address]

 

                          Phone                     [phone]









Support







                Name            Relationship    Address         Phone

 

                Nilam      Child           Unavailable     [phone]

 

                Nilam      Spouse          Unavailable     [phone]









Care Team Providers







                    Name                Role                Phone

 

                    Asked,  Pcp         Primary Care Physician Unavailable

 

                    Darshan Quiroz DO Attending Clinician [phone]









Payers







           Payer Name Policy Type Policy     Effective Date Expiration Date Sour

ce



                                 Number                           

 

           BCBSBCBS JAMIE            [id number] 2017            [facility]



           [id number]/                                             



           17-PresentHMO                                             







Problems

This patient has no known problems.



Allergies, Adverse Reactions, Alerts

This patient has no known allergies or adverse reactions.



Social History







           Social Habit Start Date Stop Date  Quantity   Comments   Source

 

           Sex Assigned At Birth                                             Ela Barth

 

           Exposure to SARS-CoV-2                       Not sure              [facility]



           (event)                                                







Medications







       Ordered Filled Start  Stop   Current Ordering Indication Dosage Frequency

 Signature

                    Comments            Components          Source



     Medication Medication Date Date Medication? Clinician                (SIG) 

          



     Name Name                                                   

 

     ciprofloxac      2020      Yes            500mg Q.5D Take 500           H

ouraymundo



     in (CIPRO)      2-11                               mg by           Methodi



     500 MG      02:45:                               mouth 2           st



     tablet      51                                 (two)           



                                                  times a           



                                                  day.           

 

     rOPINIRole      2020      Yes            3mg  QD   Take 3 mg           Ho

nidia



     (REQUIP) 3      2-11                               by mouth           [facility]

di



     MG tablet      02:45:                               nightly.           st



               51                                                

 

     DULoxetine      2020      Yes            60mg QD   Take 60 mg           H

[place]



     (CYMBALTA)      2-11                               by mouth           [facility]

di



     60 MG      02:45:                               daily.           st



     capsule      51                                                

 

     gabapentin      2020      Yes            300mg Q.5D Take 300           Ho

[place]



     (NEURONTIN)      2-11                               mg by           Methodi



     300 mg      02:45:                               mouth 2           st



     capsule      51                                 (two)           



                                                  times a           



                                                  day.           

 

     candesartan      2020      Yes            16mg QD   Take 16 mg           

[place]



     (ATACAND)      2-11                               by mouth           Method

i



     16 MG      02:45:                               daily.           st



     tablet      51                                                

 

     metFORMIN      2020      Yes            1000mg Q.5D Take 1,000           

[place]



     (GLUCOPHAGE      2-11                               mg by           [facility]



     ) 1,000 mg      02:45:                               mouth 2           st



     tablet      51                                 (two)           



                                                  times a           



                                                  day with           



                                                  meals.           

 

     levothyroxi      2020      Yes            75ug QD   Take 75           Ela

[facility]



     ne        2-11                               mcg by           [facility]



     (SYNTHROID)      02:45:                               mouth           st



     75 mcg      51                                 daily.           



     tablet                                                        

 

     tamsulosin      2020      Yes            .4mg QD   Take 0.4           Ela

[place]



     (FLOMAX)      2-11                               mg by           [facility]



     0.4 mg      02:45:                               mouth           st



     capsule      51                                 daily.           

 

     insulin      2020      Yes            40U  QD   Inject 40           [place]

on



     glargine,hu      2-11                               Units           Methodi



     m.rec.anlog      02:45:                               under the           s

t



     (TOUJEO      51                                 skin           



     SOLOSTAR                                         daily.           



     U-300                                                        



     INSULIN                                                        



     SUBQ)                                                        







Vital Signs







             Vital Name   Observation Time Observation Value Comments     Source

 

             Systolic blood 2020 06:30:00 171 mm[Hg]                [facility]

n [facility]



             pressure                                            

 

             Diastolic blood 2020 06:30:00 86 mm[Hg]                 Sukht

on [facility]



             pressure                                            

 

             Heart rate   2020 06:30:00 98 /min                   Nathan Barth

 

             Respiratory rate 2020 06:30:00 18 /min                   Sukh

ton [facility]

 

             Oxygen saturation in 2020 06:30:00 98 /min                   

Nathan Barth



             Arterial blood by                                        



             Pulse oximetry                                        

 

             Body height  2020 02:34:00 180.3 cm                  Nathan Barth

 

             Body weight  2020 02:34:00 99.791 kg                 Nathan Barth

 

             BMI          2020 02:34:00 30.68 kg/m2               Nathan Barth

 

             Body temperature 2020 02:22:00 36.61 Baylee                 Sukh Barth







Procedures







                Procedure       Date / Time     Performing Clinician Source



                                Performed                       

 

                URINE CULTURE   2020 04:12:00 Kurt Quiroz Me

rubio Sexton          

 

                 COMPLETE BLD COUNT 2020 03:21:00 Kurt Quiroz



                W/AUTO DIFF                     Darshan          

 

                PROTHROMBIN TIME WITH INR 2020 03:21:00 Kurt Quiroz          

 

                PARTIAL THROMBOPLASTIN 2020 03:21:00 Kurt Quirozu

sto[facility]



                TIME (PTT)                      Darshan          

 

                URINALYSIS SCREEN AND 2020 03:21:00 Kurt Quiroz



                MICROSCOPY, WITH REFLEX TO                 Darshan          



                CULTURE                                         

 

                BASIC METABOLIC PANEL 2020 02:35:00 Kurt Quiroz          

 

                ESTIMATED GFR   2020 02:35:00 Kurt Quiroz Me

thodist



                                                Darshan          







Encounters







        Start   End     Encounter Admission Attending Care    Care    Encounter 

Source



        Date/Time Date/Time Type    Type    Clinicians Facility Department ID   

   

 

        2020 Emergency         [facility]     064     [id number]

[address] [place]



        00:00:00 00:00:00                 KURT                 273     [facility]

di



                                                                        st







Results







           Test Description Test Time  Test       Results    Result     Source



                                 Comments              Comments   

 

           Urine culture 2020             Urine culture isolateNo          

  [place]



                      2                     growth after 24            [facility]



                      09:41:07              hoursMixed miller <=10-3            



                                            col/cc Comment:            



                                            Specimen              



                                            InformationSpecimen            



                                            Source: UrineSpecimen            



                                            Site: Catheterized            



                                            [facility]              









                    Partial thromboplastin time, activated 2020 04:31:57 









                      Test Item  Value      Reference Range Interpretation Comme

nts









             PTT (test code = 52418-2) 28.4         23.0- 36.0 sec              

PTT therapeutic range for



                                                                 unfractionated 

heparin is61.0-112.0



                                                                 seconds which c

orresponds to



                                                                 Anti-Xa0.3-0.7 

U/ml.



[place] MethodistUrinalysis screen and microscopy, with reflex to culture
2020 04:31:33





             Test Item    Value        Reference Range Interpretation Comments

 

             Specimen site (test code = Catheterized                           



             0989004)                                            

 

             Color, UA (test code = 5778-6) [place]                               

   

 

             Appearance, UA (test code = Hazy                                   



             5767-9)                                             

 

             Specific gravity, UA (test code 1.041        1.001-1.035  H        

    



             = 5811-5)                                           

 

             pH, UA (test code = 5803-2) 6.0          5.0-8.5                   

 

             Protein, UA (test code = 3+           Negative     A            



             17948-2)                                            

 

             Glucose, UA (test code = 3+           Negative     A            



             86537-8)                                            

 

             Ketones, UA (test code = Negative     Negative                  



             2514-8)                                             

 

             Bilirubin, UA (test code = Positive@UBIL Negative     A            



             5770-3)                                             

 

             Blood, UA (test code = 5794-3) Moderate     Negative     A         

   

 

             Nitrite, UA (test code = Negative     Negative                  



             5802-4)                                             

 

             Urobilinogen, UA (test code = <2.0         <2.0                    

  



             02423-6)                                            

 

             Leukocyte esterase, UA (test Moderate     Negative     A           

 



             code = 5799-2)                                        

 

             Epithelial cells, UA (test code 1            /HPF                  

    



             = 5787-7)                                           

 

             WBC, UA (test code = 5821-4) >180         0- 1 /HPF    H           

 

 

             RBC, UA (test code = 55125-0) >180         0- 5 /HPF    H          

  

 

             Bacteria, UA (test code = Few          None seen                 



             77467-0)                                            

 

             WBC clumps, UA (test code = Few                       A            



             04008-3)                                            

 

             Yeast, UA (test code = 78653-2) None seen                          

    

 

             Yeast with pseudohyphae, UA None seen                              



             (test code = 64127-8)                                        

 

             Lab Interpretation (test code = Abnormal                           

    



             55064-2)                                            



[facility] MethodistProthrombin time with [id number] 04:31:19





             Test Item    Value        Reference Range Interpretation Comments

 

             Prothrombin time (test 14.3         11.5- 14.5 sec              



             code = 5902-2)                                        

 

             INR (test code = 1.1                                    The Interna

tional



             81620-1)                                            Normalized Rati

o (INR) is



                                                                 a therapeutic m

onitoring



                                                                 tool for patien

ts who are



                                                                 stable on oral



                                                                 anticoagulant t

herapy. An



                                                                 INR of 2.0-3.0 

is



                                                                 suggested for d

eep vein



                                                                 thrombosis/pulm

onary



                                                                 embolism.



[facility] MethodistBasic metabolic [id number] 04:11:04





             Test Item    Value        Reference Range Interpretation Comments

 

             Sodium (test code = 2951-2) 137          135- 148 mEq/L            

  

 

             Potassium (test code = 2823-3) 4.0          3.5- 5.0 mEq/L         

     

 

             Chloride (test code = 2075-0) 100          98- 112 mEq/L           

   

 

             CO2 (test code = -9) 24           24- 31 mEq/L              

 

             Anion gap (test code = 33037-3) 13@ANIO      7- 15 mEq/L           

    

 

             BUN (test code = 3094-0) 16 mg/dL     6-20                      

 

             Creatinine (test code = 2160-0) 0.96 mg/dL   0.7-1.2               

    

 

             Glucose (test code = 2345-7) 264 mg/dL    65-99        H           

 

 

             Calcium (test code = 24660-0) 9.0 mg/dL    8.3-10.2                

  

 

             Lab Interpretation (test code = Abnormal                           

    



             77693-8)                                            



[facility] MethodistEstimated [id number] 04:11:04





             Test Item    Value        Reference Range Interpretation Comments

 

             Estimated GFR (test 88           mL/min[facility]1.73 m2              [facility]  Units



             code = 5488)                                        InterpretationG

1



                                                                 >=90     Normal

 or highG2



                                                                       60-89    

Mildly



                                                                 jggxupoczL0g   

     45-59



                                                                  Mildly to mode

rately



                                                                 wwhivayypM2b   

     30-44



                                                                  Moderately to 

severely



                                                                 decreasedG4    

     15-29



                                                                  Severely decre

asedG5



                                                                   <15      Kidn

ey



                                                                 failureThe eGFR

 was



                                                                 calculated carlos nayak the



                                                                 Chronic Kidney 

Disease



                                                                 Epidemiology Co

llaboration



                                                                 (CKD-EPI) equat

ion.



                                                                 Interpretation 

is based on



                                                                 recommendations

 of the



                                                                 National Kidney



                                                                 Foundation-Kidn

ey Disease



                                                                 Outcomes Qualit

y Initiative



                                                                 (NKF-KDOQI) pub

lished in



                                                                 2014.



[facility] MethodistCBC with platelet and [id number] 03:42:23





             Test Item    Value        Reference Range Interpretation Comments

 

             WBC (test code = 80078-7) 7.39         4.50- 11.00 k/uL            

  

 

             RBC (test code = 42817-8) 5.17 m/uL    4.4-6                     

 

             HGB (test code = 718-7) 14.3 g/dL    14-18                     

 

             HCT (test code = 4544-3) 44.3 %       41-51                     

 

             MCV (test code = 787-2) 85.7 fL                          

 

             MCH (test code = 785-6) 27.7 pg      27-34                     

 

             MCHC (test code = 786-4) 32.3 g/dL    31-37                     

 

             RDW - SD (test code = 38.6 fL      37-55                     



             44031-6)                                            

 

             MPV (test code = 16058-0) 10.2 fL      8.8-13.2                  

 

             Platelet count (test code 325          150- 400 k/uL              



             = 38395-3)                                          

 

             Nucleated RBC (test code 0.00         /100 WBC                  



             = 75568-0)                                          

 

             Neutrophils (test code = 60.0 %       39-69                     



             17648-2)                                            

 

             Lymphocytes (test code = 25.4 %       25-45                     



             54054-6)                                            

 

             Monocytes (test code = 7.6 %        0-10                      



             26485-3)                                            

 

             Eosinophils (test code = 5.3 %        0-5          H            



             53282-1)                                            

 

             Basophils (test code = 1.4 %        0-1          H            



             88747-9)                                            

 

             Immature granulocytes 0.3 %        0-1                       "Immat

ure



             (test code = 00340-5)                                        granul

ocytes"



                                                                 (promyelocytes,



                                                                 myelocytes,



                                                                 metamyelocytes)

 

             Lab Interpretation (test Abnormal                               



             code = 88026-6)                                        



Nathan Barth